# Patient Record
Sex: FEMALE | Race: WHITE | Employment: FULL TIME | URBAN - METROPOLITAN AREA
[De-identification: names, ages, dates, MRNs, and addresses within clinical notes are randomized per-mention and may not be internally consistent; named-entity substitution may affect disease eponyms.]

---

## 2017-04-03 ENCOUNTER — TELEPHONE (OUTPATIENT)
Dept: FAMILY MEDICINE CLINIC | Facility: CLINIC | Age: 42
End: 2017-04-03

## 2017-04-03 RX ORDER — ESCITALOPRAM OXALATE 10 MG/1
10 TABLET ORAL DAILY
Qty: 90 TABLET | Refills: 0 | Status: SHIPPED | OUTPATIENT
Start: 2017-04-03 | End: 2017-06-28

## 2017-04-03 RX ORDER — ESCITALOPRAM OXALATE 10 MG/1
1 TABLET ORAL DAILY
COMMUNITY
Start: 2013-11-13 | End: 2017-04-03

## 2017-04-03 RX ORDER — FLUTICASONE PROPIONATE 50 MCG
2 SPRAY, SUSPENSION (ML) NASAL DAILY
COMMUNITY
Start: 2016-03-19 | End: 2018-01-27 | Stop reason: ALTCHOICE

## 2017-04-05 ENCOUNTER — TELEPHONE (OUTPATIENT)
Dept: FAMILY MEDICINE CLINIC | Facility: CLINIC | Age: 42
End: 2017-04-05

## 2017-04-05 NOTE — TELEPHONE ENCOUNTER
Patient [facility]. Phoned [facility]. They will fill Rx there. Patient informed Pharmacist is filling Rx for Her to  soon.   Fouzia Angeles, 04/05/2017, 2:22 PM

## 2017-06-28 RX ORDER — ESCITALOPRAM OXALATE 10 MG/1
TABLET ORAL
Qty: 90 TABLET | Refills: 0 | Status: SHIPPED | OUTPATIENT
Start: 2017-06-28 | End: 2017-10-13

## 2017-10-13 RX ORDER — ESCITALOPRAM OXALATE 10 MG/1
TABLET ORAL
Qty: 90 TABLET | Refills: 0 | Status: SHIPPED | OUTPATIENT
Start: 2017-10-13 | End: 2018-01-11

## 2017-10-13 NOTE — TELEPHONE ENCOUNTER
Future appt:  None  Last Appointment:  3/19/2016 (Physical with [facility]);  Next Physical Due: 03/20/2017    No results found for: CHOLEST, HDL, LDL, TRIGLY, TRIG  No results found for: EAG, A1C  No results found for: T4F, TSH, TSHT4    Last Labs: 4/3/2016

## 2018-01-08 RX ORDER — ESCITALOPRAM OXALATE 10 MG/1
TABLET ORAL
Qty: 90 TABLET | Refills: 0 | OUTPATIENT
Start: 2018-01-08

## 2018-01-08 NOTE — TELEPHONE ENCOUNTER
Future appt:    Last Appointment:  3/19/16  No results found for: CHOLEST, HDL, LDL, TRIGLY, TRIG  No results found for: EAG, A1C  No results found for: T4F, TSH, TSHT4    No Follow-up on file.

## 2018-01-11 ENCOUNTER — TELEPHONE (OUTPATIENT)
Dept: FAMILY MEDICINE CLINIC | Facility: CLINIC | Age: 43
End: 2018-01-11

## 2018-01-11 RX ORDER — ESCITALOPRAM OXALATE 10 MG/1
10 TABLET ORAL
Qty: 30 TABLET | Refills: 0 | Status: SHIPPED | OUTPATIENT
Start: 2018-01-11 | End: 2018-01-27

## 2018-01-11 NOTE — TELEPHONE ENCOUNTER
Your appointments     Date & Time Appointment Department [facility])    Jan 27, 2018  9:30 AM CST Exam - Established Patient with BOUBACAR Monk [facility], [facility] ([place])        Dahlia 26, S

## 2018-01-27 NOTE — PROGRESS NOTES
HPI:    Patient ID: Mauricio Stone is a 43year old female. HPI     Present for review of her medication. States that she is feeling more down. States that she is more that she doesn't care about things as much - feels \"lazy. \"   States that she chasity 30 tablet 1      Sig: Take 1 tablet (20 mg total) by mouth daily. Imaging & Referrals:  None    Patient Instructions   Increase Lexapro to 20 mg daily. Recheck in about one month - sooner if needed.    Encouraged to get physical soon         ID#

## 2018-01-30 NOTE — PATIENT INSTRUCTIONS
Increase Lexapro to 20 mg daily. Recheck in about one month - sooner if needed.    Encouraged to get physical soon

## 2018-03-24 RX ORDER — ESCITALOPRAM OXALATE 20 MG/1
TABLET ORAL
Qty: 30 TABLET | Refills: 0 | Status: SHIPPED | OUTPATIENT
Start: 2018-03-24 | End: 2018-04-26

## 2018-03-24 NOTE — TELEPHONE ENCOUNTER
Patient informed of the below recommendations. States she would like Dr. Jus Hill to be her PCP, didn't know it was listed as Dr. Kandace Lr. Patient also wants to f/u with Dr. Jus Hill so she will c/b when she needs another RF to see if she can get one at that time.

## 2018-03-24 NOTE — TELEPHONE ENCOUNTER
Appt scheduled with Dr. En Prado on 6/14/2018 (per her request). Patient states she had to cancel her previous appt due to her work schedule and wanted to r/s for this summer.   Patient states she will be out of this medication today and is hoping to get RFs t

## 2018-03-24 NOTE — TELEPHONE ENCOUNTER
This is NEW medication prescribed by CS in January. Pt was to return in a MONTH for f.u. I will give her a month refill at this time but I am not comfortable with her on a new medication for this long with no followup.   Pt can make appt with CS for f.u if

## 2018-04-26 NOTE — PROGRESS NOTES
HPI:    Patient ID: Jennie Viera is a 37year old female. HPI    Patient is present for review of her Lexapro. States that she is doing very well with the adjustment in dosage. States that she is able to concentrate better. Denies any side effects. #[id number]

## 2018-06-23 NOTE — PROGRESS NOTES
HPI:    Patient ID: Gifty Torres is a 37year old female. HPI     Doing well with her Lexapro. States that her appointment for a physical with Dr. Shawn Flowers was cancelled and she was concerned about running out of her medication.  She will reschedule her

## 2018-07-16 ENCOUNTER — OFFICE VISIT (OUTPATIENT)
Dept: FAMILY MEDICINE CLINIC | Facility: CLINIC | Age: 43
End: 2018-07-16

## 2018-07-16 VITALS
TEMPERATURE: 98 F | SYSTOLIC BLOOD PRESSURE: 104 MMHG | DIASTOLIC BLOOD PRESSURE: 70 MMHG | HEART RATE: 68 BPM | HEIGHT: 70 IN | WEIGHT: 174 LBS | BODY MASS INDEX: 24.91 KG/M2 | RESPIRATION RATE: 16 BRPM

## 2018-07-16 DIAGNOSIS — T14.8XXA HEMATOMA: Primary | ICD-10-CM

## 2018-07-16 PROCEDURE — 99213 OFFICE O/P EST LOW 20 MIN: CPT | Performed by: FAMILY MEDICINE

## 2018-07-16 NOTE — PROGRESS NOTES
[facility] [place]  PROGRESS NOTE        HPI:   This is a 37year old female coming in for Patient presents with:  Fall: fell down stairs a week and a half ago, bump on right buttock    HPI  States eshe fell down her steps about a week and half (36.7 °C) (Temporal)   Resp 16   Ht 70\"   Wt 174 lb   LMP 07/15/2018   BMI 24.97 kg/m²  Estimated body mass index is 24.97 kg/m² as calculated from the following:    Height as of this encounter: 70\". Weight as of this encounter: 174 lb.    Vital signs

## 2018-07-17 ENCOUNTER — HOSPITAL ENCOUNTER (OUTPATIENT)
Dept: GENERAL RADIOLOGY | Age: 43
Discharge: HOME | End: 2018-07-17
Attending: FAMILY MEDICINE
Payer: COMMERCIAL

## 2018-07-17 ENCOUNTER — TELEPHONE (OUTPATIENT)
Dept: FAMILY MEDICINE CLINIC | Facility: CLINIC | Age: 43
End: 2018-07-17

## 2018-07-17 DIAGNOSIS — T14.8XXA HEMATOMA: ICD-10-CM

## 2018-07-17 PROCEDURE — 72170 X-RAY EXAM OF PELVIS: CPT | Performed by: FAMILY MEDICINE

## 2018-07-17 NOTE — TELEPHONE ENCOUNTER
----- Message from Yanet Hawkins MD sent at 7/17/2018 10:50 AM CDT -----  No evidence of fracture. Pain medication. Monitor    Call if not improving.

## 2018-07-23 NOTE — TELEPHONE ENCOUNTER
No return calls from [address] [facility] [address] message sent to patient with xray results    Dio Jenkins, 07/23/18, 9:30 AM

## 2018-07-23 NOTE — TELEPHONE ENCOUNTER
Patient notified of results and recommendations and expressed understanding.     Omaira Jenkins, 07/23/18, 9:26 AM

## 2018-08-06 ENCOUNTER — OFFICE VISIT (OUTPATIENT)
Dept: FAMILY MEDICINE CLINIC | Facility: CLINIC | Age: 43
End: 2018-08-06
Payer: COMMERCIAL

## 2018-08-06 VITALS
HEIGHT: 70 IN | BODY MASS INDEX: 24.39 KG/M2 | HEART RATE: 72 BPM | DIASTOLIC BLOOD PRESSURE: 62 MMHG | SYSTOLIC BLOOD PRESSURE: 96 MMHG | TEMPERATURE: 98 F | RESPIRATION RATE: 18 BRPM | WEIGHT: 170.38 LBS

## 2018-08-06 DIAGNOSIS — Z00.00 ENCOUNTER FOR ROUTINE ADULT HEALTH EXAMINATION WITHOUT ABNORMAL FINDINGS: Primary | ICD-10-CM

## 2018-08-06 DIAGNOSIS — Z12.11 SCREENING FOR COLON CANCER: ICD-10-CM

## 2018-08-06 LAB
OCCULT BLOOD, STOOL 1: NEGATIVE
PERFORMANCE MONITORS CORRECT (YES/NO): YES YES/NO
TEST CARD EXPIRATION DATE: 01 21 DATE

## 2018-08-06 PROCEDURE — 88175 CYTOPATH C/V AUTO FLUID REDO: CPT | Performed by: FAMILY MEDICINE

## 2018-08-06 PROCEDURE — 93000 ELECTROCARDIOGRAM COMPLETE: CPT | Performed by: FAMILY MEDICINE

## 2018-08-06 PROCEDURE — 82272 OCCULT BLD FECES 1-3 TESTS: CPT | Performed by: FAMILY MEDICINE

## 2018-08-06 PROCEDURE — 99396 PREV VISIT EST AGE 40-64: CPT | Performed by: FAMILY MEDICINE

## 2018-08-06 RX ORDER — ESCITALOPRAM OXALATE 20 MG/1
20 TABLET ORAL
Qty: 90 TABLET | Refills: 3 | Status: SHIPPED | OUTPATIENT
Start: 2018-08-06 | End: 2018-12-17

## 2018-08-06 NOTE — PROGRESS NOTES
[facility] [place]  PROGRESS NOTE        HPI:   This is a 37year old female coming in for Patient presents with:  Physical    HPI  Pt here for full physical.   No concerns today. Overall doing well.    Cycles are every 38 days, short 4 days an Negative. Neurological: Negative. Hematological: Negative. Psychiatric/Behavioral: Negative. All other systems reviewed and are negative.       EXAM:   BP 96/62 (BP Location: Left arm, Patient Position: Sitting, Cuff Size: adult)   Pulse 72   Te routine adult health examination without abnormal findings  -     COMP METABOLIC PANEL (14); Future  -     LIPID PANEL;  Future  -     TSH W REFLEX TO FREE T4; Future  -     VITAMIN D, 25-HYDROXY; Future  -     ELECTROCARDIOGRAM, COMPLETE    Reviewed health

## 2018-08-11 ENCOUNTER — APPOINTMENT (OUTPATIENT)
Dept: LAB | Age: 43
End: 2018-08-11
Attending: FAMILY MEDICINE
Payer: COMMERCIAL

## 2018-08-11 DIAGNOSIS — Z00.00 ENCOUNTER FOR ROUTINE ADULT HEALTH EXAMINATION WITHOUT ABNORMAL FINDINGS: ICD-10-CM

## 2018-08-11 LAB
ALBUMIN SERPL-MCNC: 4.2 G/DL (ref 3.5–4.8)
ALBUMIN/GLOB SERPL: 1.3 {RATIO} (ref 1–2)
ALP LIVER SERPL-CCNC: 71 U/L (ref 37–98)
ALT SERPL-CCNC: 21 U/L (ref 14–54)
ANION GAP SERPL CALC-SCNC: 10 MMOL/L (ref 0–18)
AST SERPL-CCNC: 18 U/L (ref 15–41)
BILIRUB SERPL-MCNC: 0.3 MG/DL (ref 0.1–2)
BUN BLD-MCNC: 12 MG/DL (ref 8–20)
BUN/CREAT SERPL: 14.5 (ref 10–20)
CALCIUM BLD-MCNC: 9 MG/DL (ref 8.3–10.3)
CHLORIDE SERPL-SCNC: 105 MMOL/L (ref 101–111)
CHOLEST SMN-MCNC: 165 MG/DL (ref ?–200)
CO2 SERPL-SCNC: 25 MMOL/L (ref 22–32)
CREAT BLD-MCNC: 0.83 MG/DL (ref 0.55–1.02)
GLOBULIN PLAS-MCNC: 3.3 G/DL (ref 2.5–3.7)
GLUCOSE BLD-MCNC: 91 MG/DL (ref 70–99)
HDLC SERPL-MCNC: 75 MG/DL (ref 40–59)
LDLC SERPL CALC-MCNC: 73 MG/DL (ref ?–100)
M PROTEIN MFR SERPL ELPH: 7.5 G/DL (ref 6.1–8.3)
NONHDLC SERPL-MCNC: 90 MG/DL (ref ?–130)
OSMOLALITY SERPL CALC.SUM OF ELEC: 289 MOSM/KG (ref 275–295)
POTASSIUM SERPL-SCNC: 4.3 MMOL/L (ref 3.6–5.1)
SODIUM SERPL-SCNC: 140 MMOL/L (ref 136–144)
TRIGL SERPL-MCNC: 87 MG/DL (ref 30–149)
TSI SER-ACNC: 0.84 MIU/ML (ref 0.35–5.5)
VIT D+METAB SERPL-MCNC: 16.8 NG/ML (ref 30–100)
VLDLC SERPL CALC-MCNC: 17 MG/DL (ref 0–30)

## 2018-08-11 PROCEDURE — 82306 VITAMIN D 25 HYDROXY: CPT | Performed by: FAMILY MEDICINE

## 2018-08-11 PROCEDURE — 36415 COLL VENOUS BLD VENIPUNCTURE: CPT | Performed by: FAMILY MEDICINE

## 2018-08-11 PROCEDURE — 80061 LIPID PANEL: CPT | Performed by: FAMILY MEDICINE

## 2018-08-11 PROCEDURE — 80053 COMPREHEN METABOLIC PANEL: CPT | Performed by: FAMILY MEDICINE

## 2018-08-11 PROCEDURE — 84443 ASSAY THYROID STIM HORMONE: CPT | Performed by: FAMILY MEDICINE

## 2018-08-13 ENCOUNTER — TELEPHONE (OUTPATIENT)
Dept: FAMILY MEDICINE CLINIC | Facility: CLINIC | Age: 43
End: 2018-08-13

## 2018-08-13 DIAGNOSIS — E55.9 VITAMIN D DEFICIENCY: Primary | ICD-10-CM

## 2018-08-13 NOTE — TELEPHONE ENCOUNTER
----- Message from Ceci Woods MD sent at 8/13/2018  9:27 AM CDT -----  Vitamin D is very low,   Recommend 13575 units of vitamin D weekly x 12 weeks. Recheck vitamin D level in 3 months. Then follow up with 2000 units of vitamin D daily.

## 2018-08-17 RX ORDER — ERGOCALCIFEROL 1.25 MG/1
50000 CAPSULE ORAL WEEKLY
Qty: 12 CAPSULE | Refills: 0 | Status: SHIPPED | OUTPATIENT
Start: 2018-08-17 | End: 2018-11-03

## 2018-09-29 ENCOUNTER — IMMUNIZATION (OUTPATIENT)
Dept: FAMILY MEDICINE CLINIC | Facility: CLINIC | Age: 43
End: 2018-09-29
Payer: COMMERCIAL

## 2018-09-29 DIAGNOSIS — Z23 NEED FOR VACCINATION: ICD-10-CM

## 2018-09-29 PROCEDURE — 90471 IMMUNIZATION ADMIN: CPT | Performed by: FAMILY MEDICINE

## 2018-09-29 PROCEDURE — 90686 IIV4 VACC NO PRSV 0.5 ML IM: CPT | Performed by: FAMILY MEDICINE

## 2018-11-12 RX ORDER — ERGOCALCIFEROL 1.25 MG/1
CAPSULE ORAL
Qty: 12 CAPSULE | Refills: 0 | OUTPATIENT
Start: 2018-11-12

## 2018-11-12 NOTE — TELEPHONE ENCOUNTER
Refill is not appropriate at this time  Patient is due for a recheck of her vitamin d level per Dr. Whalen Greek recommendations  Patient notified and expressed understanding  Refill denied    May Jarrett, 11/12/18, 9:44 AM

## 2018-11-23 RX ORDER — ERGOCALCIFEROL 1.25 MG/1
CAPSULE ORAL
Qty: 12 CAPSULE | Refills: 0 | OUTPATIENT
Start: 2018-11-23

## 2018-11-23 NOTE — TELEPHONE ENCOUNTER
Informed pt she was due for a lab draw to see where Vitamin D levels are at. There is an order in chart. Patient refused to make appt for lab draw and stated she would just start taking a daily supplement.  Pt was not taking a supplement before starting the

## 2018-12-18 NOTE — PATIENT INSTRUCTIONS
Vitalnutrients. net  (0528 access number) for vitamins     Vitamin D is sub therapeutic. Recommend 2000 units of vitamin D daily, increase if already taking. Goal of 51 for vitamin D   Recheck vitamin D level in 6  months.

## 2018-12-18 NOTE — PROGRESS NOTES
[facility] [place]  PROGRESS NOTE        HPI:   This is a 37year old female coming in for Patient presents with:  Medication Follow-Up: lexapro    HPI  Employer went with a new prescription coverage plan and now needs to go through express scri WISDOM TEETH REMOVED       Social History:  Social History    Social History Narrative      Lives at home       Farms:       Teaches full time. 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      Social History    To Constitutional: She is oriented to person, place, and time. She appears well-developed and well-nourished. HENT:   Head: Normocephalic and atraumatic.    Right Ear: External ear normal.   Left Ear: External ear normal.   Nose: Nose normal.   Mouth/Throa Kg Schwartz MD  12/17/2018  6:31 PM  Immunization History   Administered Date(s) Administered   • FLULAVAL 6 months & older 0.5 ml Prefilled syringe (40742) 09/29/2018   • TDAP 09/08/2014       Most Recent Immunizations  Administered

## 2019-08-06 ENCOUNTER — HOSPITAL ENCOUNTER (OUTPATIENT)
Dept: MAMMOGRAPHY | Age: 44
Discharge: HOME | End: 2019-08-06
Attending: FAMILY MEDICINE
Payer: COMMERCIAL

## 2019-08-06 DIAGNOSIS — Z12.31 BREAST CANCER SCREENING BY MAMMOGRAM: ICD-10-CM

## 2019-08-06 PROCEDURE — 77063 BREAST TOMOSYNTHESIS BI: CPT | Performed by: FAMILY MEDICINE

## 2019-08-06 PROCEDURE — 77067 SCR MAMMO BI INCL CAD: CPT | Performed by: FAMILY MEDICINE

## 2019-10-15 ENCOUNTER — OFFICE VISIT (OUTPATIENT)
Dept: FAMILY MEDICINE CLINIC | Facility: CLINIC | Age: 44
End: 2019-10-15
Payer: COMMERCIAL

## 2019-10-15 VITALS
HEART RATE: 66 BPM | DIASTOLIC BLOOD PRESSURE: 70 MMHG | OXYGEN SATURATION: 99 % | SYSTOLIC BLOOD PRESSURE: 118 MMHG | RESPIRATION RATE: 16 BRPM | TEMPERATURE: 98 F | WEIGHT: 181.19 LBS | HEIGHT: 70 IN | BODY MASS INDEX: 25.94 KG/M2

## 2019-10-15 DIAGNOSIS — S61.301D: Primary | ICD-10-CM

## 2019-10-15 PROCEDURE — 99213 OFFICE O/P EST LOW 20 MIN: CPT | Performed by: NURSE PRACTITIONER

## 2019-10-15 RX ORDER — CEPHALEXIN 500 MG/1
1 CAPSULE ORAL 4 TIMES DAILY
COMMUNITY
Start: 2019-10-14 | End: 2020-10-26 | Stop reason: ALTCHOICE

## 2019-10-15 NOTE — PROGRESS NOTES
HPI:    Patient ID: Ian Bales is a 40year old female. HPI     Patient is present with her  with concerns about her left little finger. States on Sunday she caught it in a log splitter [place] their cabin. Went to the ER in [place].  States th Open wound of left index finger with damage to nail, subsequent encounter  (primary encounter diagnosis)    No orders of the defined types were placed in this encounter.       Meds This Visit:  Requested Prescriptions      No prescriptions requested or orde

## 2019-10-16 RX ORDER — ESCITALOPRAM OXALATE 20 MG/1
TABLET ORAL
Qty: 90 TABLET | Refills: 3 | Status: SHIPPED | OUTPATIENT
Start: 2019-10-16 | End: 2019-10-19 | Stop reason: ALTCHOICE

## 2019-10-16 NOTE — TELEPHONE ENCOUNTER
Last refill 12/17/19  Future appt:     Your appointments     Date & Time Appointment Department [facility])    Oct 17, 2019  3:00 PM CDT Follow Up Visit with Cesario Doty [address] [place] [address], [place] ([place])

## 2019-10-17 ENCOUNTER — OFFICE VISIT (OUTPATIENT)
Dept: FAMILY MEDICINE CLINIC | Facility: CLINIC | Age: 44
End: 2019-10-17
Payer: COMMERCIAL

## 2019-10-17 ENCOUNTER — HOSPITAL ENCOUNTER (OUTPATIENT)
Dept: GENERAL RADIOLOGY | Age: 44
Discharge: HOME | End: 2019-10-17
Attending: NURSE PRACTITIONER
Payer: COMMERCIAL

## 2019-10-17 VITALS
HEART RATE: 74 BPM | BODY MASS INDEX: 25.91 KG/M2 | RESPIRATION RATE: 14 BRPM | WEIGHT: 181 LBS | SYSTOLIC BLOOD PRESSURE: 110 MMHG | DIASTOLIC BLOOD PRESSURE: 64 MMHG | TEMPERATURE: 97 F | HEIGHT: 70 IN | OXYGEN SATURATION: 99 %

## 2019-10-17 DIAGNOSIS — M79.645 FINGER PAIN, LEFT: ICD-10-CM

## 2019-10-17 DIAGNOSIS — S62.637D OPEN DISPLACED FRACTURE OF DISTAL PHALANX OF LEFT LITTLE FINGER WITH ROUTINE HEALING, SUBSEQUENT ENCOUNTER: Primary | ICD-10-CM

## 2019-10-17 PROCEDURE — 73140 X-RAY EXAM OF FINGER(S): CPT | Performed by: NURSE PRACTITIONER

## 2019-10-17 PROCEDURE — 99214 OFFICE O/P EST MOD 30 MIN: CPT | Performed by: NURSE PRACTITIONER

## 2019-10-17 NOTE — PATIENT INSTRUCTIONS
Referral to ortho - Dr. Esther Contreras. Take copy of x-ray to the appointment. 8:10 am,    Keep finger clean and dry. Continue with Keflex until done. Return to clinic if any signs of infection.

## 2019-10-17 NOTE — PROGRESS NOTES
HPI:    Patient ID: Gifty Torres is a 40year old female. HPI  Patient presents to the clinic today for follow up of left finger injury. Patient was using log splitter when she caught her left 5th finger in it. She had a surgicel wrap placed at the t Return to clinic if any signs of infection.               RA#[id number]

## 2019-10-18 NOTE — PATIENT INSTRUCTIONS
Keep area clean and dry. Recheck in 2 days to have dressing checked and x-ray without the surgicel and dressing. Continue with ibuprofen as needed.

## 2019-10-19 ENCOUNTER — OFFICE VISIT (OUTPATIENT)
Dept: FAMILY MEDICINE CLINIC | Facility: CLINIC | Age: 44
End: 2019-10-19
Payer: COMMERCIAL

## 2019-10-19 VITALS
TEMPERATURE: 98 F | DIASTOLIC BLOOD PRESSURE: 64 MMHG | WEIGHT: 176.38 LBS | HEIGHT: 70 IN | RESPIRATION RATE: 16 BRPM | HEART RATE: 60 BPM | OXYGEN SATURATION: 99 % | BODY MASS INDEX: 25.25 KG/M2 | SYSTOLIC BLOOD PRESSURE: 108 MMHG

## 2019-10-19 DIAGNOSIS — S62.637D OPEN DISPLACED FRACTURE OF DISTAL PHALANX OF LEFT LITTLE FINGER WITH ROUTINE HEALING, SUBSEQUENT ENCOUNTER: Primary | ICD-10-CM

## 2019-10-19 PROBLEM — S62.637B OPEN DISPLACED FRACTURE OF DISTAL PHALANX OF LEFT LITTLE FINGER: Status: ACTIVE | Noted: 2019-10-19

## 2019-10-19 PROCEDURE — 99213 OFFICE O/P EST LOW 20 MIN: CPT | Performed by: FAMILY MEDICINE

## 2019-10-19 RX ORDER — HYDROCODONE BITARTRATE AND ACETAMINOPHEN 5; 325 MG/1; MG/1
1 TABLET ORAL EVERY 4 HOURS PRN
COMMUNITY
Start: 2019-10-17 | End: 2020-10-26 | Stop reason: ALTCHOICE

## 2019-10-19 NOTE — PROGRESS NOTES
[facility] [place]  PROGRESS NOTE        HPI:   This is a 40year old female coming in for Patient presents with:   Follow - Up: Fractured finger, dressing change    HPI patient presents for follow-up and dressing change of her compressed fractur information on file (likely too young). Social History Narrative      Lives at home       Farms:       Teaches full time. 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      Social History    Tobacco Use      Smok oz (77.3 kg)  07/16/18 : 174 lb (78.9 kg)     BP Readings from Last 3 Encounters:  10/19/19 : 108/64  10/17/19 : 110/64  10/15/19 : 118/70       Vital signs reviewed. Physical Exam Musculoskeletal:      Comments: Full range of motion of the fifth digit.

## 2020-04-03 ENCOUNTER — TELEPHONE (OUTPATIENT)
Dept: FAMILY MEDICINE CLINIC | Facility: CLINIC | Age: 45
End: 2020-04-03

## 2020-04-03 DIAGNOSIS — N92.1 MENOMETRORRHAGIA: Primary | ICD-10-CM

## 2020-04-03 NOTE — TELEPHONE ENCOUNTER
Patient states she has had her menstrual cycle for about 3 weeks now. States it has not been heavy; about light to medium. Patient states she uses pads and has been having to change it about 3-4 times a day.   Patient also c/o slight cramping at times but

## 2020-04-03 NOTE — TELEPHONE ENCOUNTER
Spoke with Dimitrios Kaur as all schedules are on hold next week. She stated that this is being worked on this afternoon and that it is OK to schedule patient on Tuesday, 4/7/2020, when Dr. Urszula Mcneal is in the office.   Dimitrios Kaur will need to be informed once we know what

## 2020-04-03 NOTE — TELEPHONE ENCOUNTER
Dr. Padmini Zaldivar is out of the office today. Recommend that patient schedule an appointment with a provider in the office. May need labs and/or ultrasound.

## 2020-04-06 NOTE — TELEPHONE ENCOUNTER
Pt states her bleeding has slowed down and now is just a trickle,  She state the bleeding was manageable. Follow up in 1 month. She feels like it could wait.

## 2020-10-21 NOTE — TELEPHONE ENCOUNTER
RF for Escitalopram; hasn't been refilled since 12/17/2018. Placed call to patient to confirm Rx. No answer and mailbox is full.

## 2020-10-26 NOTE — PROGRESS NOTES
[facility] [place]  PROGRESS NOTE        HPI:   This is a 39year old female coming in for Patient presents with:  Medication Follow-Up: Pt states that meds are making her dizzy    HPI  States that her lexapro has been making her dizzy for the p Depression      Past Surgical History:   Procedure Laterality Date   •      • WISDOM TEETH REMOVED       Social History:  Social History    Social History Narrative      Lives at home       Farms:       Teaches full time.        Has 3 children:  Gr 9.6 oz (80.6 kg)   LMP 10/22/2020   SpO2 97%   BMI 25.48 kg/m²  Estimated body mass index is 25.48 kg/m² as calculated from the following:    Height as of this encounter: 70\". Weight as of this encounter: 177 lb 9.6 oz (80.6 kg).    Wt Readings from [place] Tab; Take 1 tablet (10 mg total) by mouth daily.  -     FLULAVAL INFLUENZA VACCINE QUAD PRESERVATIVE FREE 0.5 ML          No problem-specific Assessment & Plan notes found for this encounter. future appointment:    No follow-ups on file.     Meds & Re

## 2020-11-03 RX ORDER — ESCITALOPRAM OXALATE 20 MG/1
TABLET ORAL
Qty: 90 TABLET | Refills: 3 | OUTPATIENT
Start: 2020-11-03

## 2020-12-28 ENCOUNTER — OFFICE VISIT (OUTPATIENT)
Dept: FAMILY MEDICINE CLINIC | Facility: CLINIC | Age: 45
End: 2020-12-28
Payer: COMMERCIAL

## 2020-12-28 VITALS
TEMPERATURE: 98 F | RESPIRATION RATE: 16 BRPM | OXYGEN SATURATION: 98 % | DIASTOLIC BLOOD PRESSURE: 80 MMHG | SYSTOLIC BLOOD PRESSURE: 104 MMHG | WEIGHT: 170.19 LBS | BODY MASS INDEX: 24.37 KG/M2 | HEIGHT: 70 IN | HEART RATE: 73 BPM

## 2020-12-28 DIAGNOSIS — M25.511 RIGHT SHOULDER PAIN, UNSPECIFIED CHRONICITY: ICD-10-CM

## 2020-12-28 DIAGNOSIS — M75.101 ROTATOR CUFF SYNDROME OF RIGHT SHOULDER: ICD-10-CM

## 2020-12-28 DIAGNOSIS — Z00.00 WELLNESS EXAMINATION: Primary | ICD-10-CM

## 2020-12-28 DIAGNOSIS — Z72.0 TOBACCO ABUSE: ICD-10-CM

## 2020-12-28 DIAGNOSIS — E55.9 VITAMIN D DEFICIENCY: ICD-10-CM

## 2020-12-28 DIAGNOSIS — N63.20 LEFT BREAST MASS: ICD-10-CM

## 2020-12-28 PROBLEM — S62.637B OPEN DISPLACED FRACTURE OF DISTAL PHALANX OF LEFT LITTLE FINGER: Status: RESOLVED | Noted: 2019-10-19 | Resolved: 2020-12-28

## 2020-12-28 PROCEDURE — 99396 PREV VISIT EST AGE 40-64: CPT | Performed by: FAMILY MEDICINE

## 2020-12-28 PROCEDURE — 99213 OFFICE O/P EST LOW 20 MIN: CPT | Performed by: FAMILY MEDICINE

## 2020-12-28 PROCEDURE — 99406 BEHAV CHNG SMOKING 3-10 MIN: CPT | Performed by: FAMILY MEDICINE

## 2020-12-28 PROCEDURE — 3074F SYST BP LT 130 MM HG: CPT | Performed by: FAMILY MEDICINE

## 2020-12-28 PROCEDURE — 3008F BODY MASS INDEX DOCD: CPT | Performed by: FAMILY MEDICINE

## 2020-12-28 PROCEDURE — 3079F DIAST BP 80-89 MM HG: CPT | Performed by: FAMILY MEDICINE

## 2020-12-28 NOTE — PATIENT INSTRUCTIONS
Call [facility] [phone] to schedule testing. MAMMOGRAM>       Quitting Smoking    Quitting smoking is the most important step you can take to improve your health. We're glad you have set a goal to improve your health.     Quit [employer]Base

## 2020-12-28 NOTE — PROGRESS NOTES
Tobacco Cessation Documentation (Smoking and Smokeless included): I had an in depth therapy session with Jennie Viera about her tobacco use risks and options using the USPSTF's Five A's approach:    Ask: Palmer Alvarado is using tobacco products.   Assess: Current Outpatient Medications   Medication Sig Dispense Refill   • escitalopram (LEXAPRO) 10 MG Oral Tab Take 1 tablet (10 mg total) by mouth daily.  30 tablet 3      HISTORICAL INFORMATION   Past Medical History:   Diagnosis Date   • Anxiety    • Paramjit : 181 lb (82.1 kg)  10/15/19 : 181 lb 3.2 oz (82.2 kg)  12/17/18 : 173 lb (78.5 kg)    Body mass index is 24.42 kg/m². Physical Exam   Constitutional: She is oriented to person, place, and time. She appears well-developed and well-nourished.    HENT:   H unspecified chronicity  -     PHYSICAL THERAPY EXTERNAL  -     URINALYSIS WITH CULTURE REFLEX; Future  -     VITAMIN B12; Future    Rotator cuff syndrome of right shoulder  -     PHYSICAL THERAPY EXTERNAL    Vitamin D deficiency  -     VITAMIN B12; Future

## 2020-12-29 ENCOUNTER — TELEPHONE (OUTPATIENT)
Dept: FAMILY MEDICINE CLINIC | Facility: CLINIC | Age: 45
End: 2020-12-29

## 2020-12-29 DIAGNOSIS — N63.20 LEFT BREAST MASS: Primary | ICD-10-CM

## 2020-12-29 NOTE — TELEPHONE ENCOUNTER
Patient states she was told that [address] wouldn't do diagnostic mammo when they haven't done the screening mammo. Per Pricila Larsen, need new order to read bilateral diagnostic mammo, coded for left breast mass.

## 2020-12-29 NOTE — TELEPHONE ENCOUNTER
Received referral for mammogram yesterday but they do not perform that type. Would like to know where else she can go.

## 2020-12-29 NOTE — TELEPHONE ENCOUNTER
Spoke with Suad Palomo at [facility] and they only diagnostic bilateral mammograms. These may not be covered as screening health maintenance but problem evaluation. See if she would like to do [place] and we can change the location.

## 2021-01-04 ENCOUNTER — TELEPHONE (OUTPATIENT)
Dept: FAMILY MEDICINE CLINIC | Facility: CLINIC | Age: 46
End: 2021-01-04

## 2021-01-04 DIAGNOSIS — N63.20 LEFT BREAST MASS: Primary | ICD-10-CM

## 2021-01-04 NOTE — TELEPHONE ENCOUNTER
Pt needs a bilateral diagnostic mammogram order.  Please inform pt when placed so she can schedule with [facility].

## 2021-01-07 ENCOUNTER — TELEPHONE (OUTPATIENT)
Dept: FAMILY MEDICINE CLINIC | Facility: CLINIC | Age: 46
End: 2021-01-07

## 2021-01-07 ENCOUNTER — HOSPITAL ENCOUNTER (OUTPATIENT)
Dept: MAMMOGRAPHY | Facility: HOSPITAL | Age: 46
Discharge: HOME | End: 2021-01-07
Attending: FAMILY MEDICINE
Payer: COMMERCIAL

## 2021-01-07 DIAGNOSIS — N63.20 LEFT BREAST MASS: ICD-10-CM

## 2021-01-07 PROCEDURE — 76641 ULTRASOUND BREAST COMPLETE: CPT | Performed by: FAMILY MEDICINE

## 2021-01-07 PROCEDURE — 77062 BREAST TOMOSYNTHESIS BI: CPT | Performed by: FAMILY MEDICINE

## 2021-01-07 PROCEDURE — 77066 DX MAMMO INCL CAD BI: CPT | Performed by: FAMILY MEDICINE

## 2021-01-07 NOTE — TELEPHONE ENCOUNTER
----- Message from Bj Tinajero MD sent at 1/7/2021  2:15 PM CST -----  benign appearing cyst correlates with exam.   No further concerning features at this time. Recommend mammogram in 1 year. Recommend breast clinical exam in 6 months.

## 2021-01-08 ENCOUNTER — LABORATORY ENCOUNTER (OUTPATIENT)
Dept: LAB | Age: 46
End: 2021-01-08
Attending: FAMILY MEDICINE
Payer: COMMERCIAL

## 2021-01-08 DIAGNOSIS — R73.9 ELEVATED BLOOD SUGAR: ICD-10-CM

## 2021-01-08 DIAGNOSIS — Z00.00 WELLNESS EXAMINATION: ICD-10-CM

## 2021-01-08 DIAGNOSIS — Z72.0 TOBACCO ABUSE: ICD-10-CM

## 2021-01-08 DIAGNOSIS — M25.511 RIGHT SHOULDER PAIN, UNSPECIFIED CHRONICITY: ICD-10-CM

## 2021-01-08 DIAGNOSIS — E55.9 VITAMIN D DEFICIENCY: ICD-10-CM

## 2021-01-08 LAB
ALBUMIN SERPL-MCNC: 4.2 G/DL (ref 3.4–5)
ALBUMIN/GLOB SERPL: 1.2 {RATIO} (ref 1–2)
ALP LIVER SERPL-CCNC: 77 U/L
ALT SERPL-CCNC: 22 U/L
ANION GAP SERPL CALC-SCNC: 3 MMOL/L (ref 0–18)
AST SERPL-CCNC: 14 U/L (ref 15–37)
BASOPHILS # BLD AUTO: 0.06 X10(3) UL (ref 0–0.2)
BASOPHILS NFR BLD AUTO: 0.6 %
BILIRUB SERPL-MCNC: 0.2 MG/DL (ref 0.1–2)
BILIRUB UR QL STRIP.AUTO: NEGATIVE
BUN BLD-MCNC: 19 MG/DL (ref 7–18)
BUN/CREAT SERPL: 21.1 (ref 10–20)
CALCIUM BLD-MCNC: 9.5 MG/DL (ref 8.5–10.1)
CHLORIDE SERPL-SCNC: 104 MMOL/L (ref 98–112)
CK SERPL-CCNC: 92 U/L
CLARITY UR REFRACT.AUTO: CLEAR
CO2 SERPL-SCNC: 29 MMOL/L (ref 21–32)
COLOR UR AUTO: COLORLESS
CREAT BLD-MCNC: 0.9 MG/DL
DEPRECATED HBV CORE AB SER IA-ACNC: 44.3 NG/ML
DEPRECATED RDW RBC AUTO: 40.4 FL (ref 35.1–46.3)
EOSINOPHIL # BLD AUTO: 0.13 X10(3) UL (ref 0–0.7)
EOSINOPHIL NFR BLD AUTO: 1.3 %
ERYTHROCYTE [DISTWIDTH] IN BLOOD BY AUTOMATED COUNT: 12.2 % (ref 11–15)
GLOBULIN PLAS-MCNC: 3.5 G/DL (ref 2.8–4.4)
GLUCOSE BLD-MCNC: 113 MG/DL (ref 70–99)
GLUCOSE UR STRIP.AUTO-MCNC: NEGATIVE MG/DL
HCT VFR BLD AUTO: 39.6 %
HGB BLD-MCNC: 13.1 G/DL
IMM GRANULOCYTES # BLD AUTO: 0.02 X10(3) UL (ref 0–1)
IMM GRANULOCYTES NFR BLD: 0.2 %
IRON SATURATION: 15 %
IRON SERPL-MCNC: 61 UG/DL
KETONES UR STRIP.AUTO-MCNC: NEGATIVE MG/DL
LEUKOCYTE ESTERASE UR QL STRIP.AUTO: NEGATIVE
LYMPHOCYTES # BLD AUTO: 2.1 X10(3) UL (ref 1–4)
LYMPHOCYTES NFR BLD AUTO: 21.4 %
M PROTEIN MFR SERPL ELPH: 7.7 G/DL (ref 6.4–8.2)
MCH RBC QN AUTO: 29.8 PG (ref 26–34)
MCHC RBC AUTO-ENTMCNC: 33.1 G/DL (ref 31–37)
MCV RBC AUTO: 90 FL
MONOCYTES # BLD AUTO: 0.63 X10(3) UL (ref 0.1–1)
MONOCYTES NFR BLD AUTO: 6.4 %
NEUTROPHILS # BLD AUTO: 6.87 X10 (3) UL (ref 1.5–7.7)
NEUTROPHILS # BLD AUTO: 6.87 X10(3) UL (ref 1.5–7.7)
NEUTROPHILS NFR BLD AUTO: 70.1 %
NITRITE UR QL STRIP.AUTO: NEGATIVE
OSMOLALITY SERPL CALC.SUM OF ELEC: 285 MOSM/KG (ref 275–295)
PATIENT FASTING Y/N/NP: NO
PH UR STRIP.AUTO: 6 [PH] (ref 4.5–8)
PLATELET # BLD AUTO: 364 10(3)UL (ref 150–450)
POTASSIUM SERPL-SCNC: 3.6 MMOL/L (ref 3.5–5.1)
PROT UR STRIP.AUTO-MCNC: NEGATIVE MG/DL
RBC # BLD AUTO: 4.4 X10(6)UL
RBC UR QL AUTO: NEGATIVE
SODIUM SERPL-SCNC: 136 MMOL/L (ref 136–145)
SP GR UR STRIP.AUTO: <1.005 (ref 1–1.03)
TOTAL IRON BINDING CAPACITY: 405 UG/DL (ref 240–450)
TRANSFERRIN SERPL-MCNC: 272 MG/DL (ref 200–360)
TSI SER-ACNC: 1.32 MIU/ML (ref 0.36–3.74)
URATE SERPL-MCNC: 3.8 MG/DL
UROBILINOGEN UR STRIP.AUTO-MCNC: <2 MG/DL
VIT B12 SERPL-MCNC: 228 PG/ML (ref 193–986)
VIT D+METAB SERPL-MCNC: 45.1 NG/ML (ref 30–100)
WBC # BLD AUTO: 9.8 X10(3) UL (ref 4–11)

## 2021-01-08 PROCEDURE — 81003 URINALYSIS AUTO W/O SCOPE: CPT | Performed by: FAMILY MEDICINE

## 2021-01-08 PROCEDURE — 83540 ASSAY OF IRON: CPT | Performed by: FAMILY MEDICINE

## 2021-01-08 PROCEDURE — 82550 ASSAY OF CK (CPK): CPT | Performed by: FAMILY MEDICINE

## 2021-01-08 PROCEDURE — 84550 ASSAY OF BLOOD/URIC ACID: CPT | Performed by: FAMILY MEDICINE

## 2021-01-08 PROCEDURE — 82306 VITAMIN D 25 HYDROXY: CPT | Performed by: FAMILY MEDICINE

## 2021-01-08 PROCEDURE — 83550 IRON BINDING TEST: CPT | Performed by: FAMILY MEDICINE

## 2021-01-08 PROCEDURE — 80050 GENERAL HEALTH PANEL: CPT | Performed by: FAMILY MEDICINE

## 2021-01-08 PROCEDURE — 36415 COLL VENOUS BLD VENIPUNCTURE: CPT | Performed by: FAMILY MEDICINE

## 2021-01-08 PROCEDURE — 82607 VITAMIN B-12: CPT | Performed by: FAMILY MEDICINE

## 2021-01-08 PROCEDURE — 83036 HEMOGLOBIN GLYCOSYLATED A1C: CPT | Performed by: FAMILY MEDICINE

## 2021-01-08 PROCEDURE — 82728 ASSAY OF FERRITIN: CPT | Performed by: FAMILY MEDICINE

## 2021-01-11 ENCOUNTER — TELEPHONE (OUTPATIENT)
Dept: FAMILY MEDICINE CLINIC | Facility: CLINIC | Age: 46
End: 2021-01-11

## 2021-01-11 DIAGNOSIS — R73.9 ELEVATED BLOOD SUGAR: Primary | ICD-10-CM

## 2021-01-11 LAB
EST. AVERAGE GLUCOSE BLD GHB EST-MCNC: 120 MG/DL (ref 68–126)
HBA1C MFR BLD HPLC: 5.8 % (ref ?–5.7)

## 2021-01-11 NOTE — TELEPHONE ENCOUNTER
----- Message from Iggy Davis MD sent at 1/11/2021 10:22 AM CST -----  Vitamin B12 is subtherapeutic.   recommend \"B complex\" and recheck level in 6 months   Iron stores are low. Vitalnutrients. net  (0528 access number) for vitamins  Iron plus c bid

## 2021-01-12 NOTE — TELEPHONE ENCOUNTER
----- Message from Franklyn Ulrich MD sent at 1/11/2021  4:51 PM CST -----  aic looks fine. Monitor diet only.

## 2021-01-25 RX ORDER — ESCITALOPRAM OXALATE 10 MG/1
TABLET ORAL
Qty: 90 TABLET | Refills: 1 | Status: SHIPPED | OUTPATIENT
Start: 2021-01-25 | End: 2021-04-16

## 2021-01-25 NOTE — TELEPHONE ENCOUNTER
Future appt:    Last Appointment with provider:   12/28/2020(Px)-F/U in 1 year  Last appointment at [facility]:  12/28/2020    escitalopram (LEXAPRO) 10 MG Oral Tab    30tab  3refill      Filled:10/26/20 Summary: Take 1 tablet (10 mg total) by mouth troy

## 2021-04-16 ENCOUNTER — TELEPHONE (OUTPATIENT)
Dept: FAMILY MEDICINE CLINIC | Facility: CLINIC | Age: 46
End: 2021-04-16

## 2021-04-16 RX ORDER — ESCITALOPRAM OXALATE 10 MG/1
10 TABLET ORAL DAILY
Qty: 90 TABLET | Refills: 1 | Status: SHIPPED | OUTPATIENT
Start: 2021-04-16 | End: 2021-07-20

## 2021-04-16 NOTE — TELEPHONE ENCOUNTER
Future appt:    Last Appointment with provider:   12/28/2020(PX)-F/U in 1 year  Last appointment at [facility]:  12/28/2020    ESCITALOPRAM 10 MG Oral Tab          90tab  1refill    Filled:1/25/21   Summary: TAKE 1 TABLET BY MOUTH EVERY DAY            Ch

## 2021-06-29 ENCOUNTER — TELEPHONE (OUTPATIENT)
Dept: FAMILY MEDICINE CLINIC | Facility: CLINIC | Age: 46
End: 2021-06-29

## 2021-06-29 DIAGNOSIS — E55.9 VITAMIN D DEFICIENCY: Primary | ICD-10-CM

## 2021-06-29 DIAGNOSIS — N92.1 MENOMETRORRHAGIA: ICD-10-CM

## 2021-06-29 NOTE — TELEPHONE ENCOUNTER
Would recommend that we get labs prior to taht,   And then refer her to gyne to consider ablation if that is what is needed.

## 2021-06-29 NOTE — TELEPHONE ENCOUNTER
Pt made appt on Rixtyt for the following:    Patient Comments:  My periods are lasting weeks. I want an ablation. Appt ok to wait?     Your appointments     Date & Time Appointment Department [facility])    Jul 20, 2021  2:40 PM CDT Office Visit with [facility]

## 2021-07-01 NOTE — TELEPHONE ENCOUNTER
Labs scheduled.     Your appointments       Date & Time Appointment Department [facility])    Jul 08, 2021 10:00 AM CDT Laboratory Visit with [facility] ([facility])          Jul 20, 2021  2:40 PM CDT Office Visit with Sofi Sandhu

## 2021-07-08 ENCOUNTER — LABORATORY ENCOUNTER (OUTPATIENT)
Dept: LAB | Age: 46
End: 2021-07-08
Attending: FAMILY MEDICINE
Payer: COMMERCIAL

## 2021-07-08 DIAGNOSIS — Z00.00 WELLNESS EXAMINATION: ICD-10-CM

## 2021-07-08 DIAGNOSIS — N92.1 MENOMETRORRHAGIA: ICD-10-CM

## 2021-07-08 DIAGNOSIS — E55.9 VITAMIN D DEFICIENCY: ICD-10-CM

## 2021-07-08 LAB
ALBUMIN SERPL-MCNC: 3.9 G/DL (ref 3.4–5)
ALBUMIN/GLOB SERPL: 1.2 {RATIO} (ref 1–2)
ALP LIVER SERPL-CCNC: 65 U/L
ALT SERPL-CCNC: 22 U/L
ANION GAP SERPL CALC-SCNC: 4 MMOL/L (ref 0–18)
AST SERPL-CCNC: 18 U/L (ref 15–37)
BASOPHILS # BLD AUTO: 0.07 X10(3) UL (ref 0–0.2)
BASOPHILS NFR BLD AUTO: 1 %
BILIRUB SERPL-MCNC: 0.3 MG/DL (ref 0.1–2)
BILIRUB UR QL STRIP.AUTO: NEGATIVE
BUN BLD-MCNC: 10 MG/DL (ref 7–18)
BUN/CREAT SERPL: 13.9 (ref 10–20)
CALCIUM BLD-MCNC: 8.5 MG/DL (ref 8.5–10.1)
CHLORIDE SERPL-SCNC: 108 MMOL/L (ref 98–112)
CHOLEST SMN-MCNC: 182 MG/DL (ref ?–200)
CK SERPL-CCNC: 151 U/L
CLARITY UR REFRACT.AUTO: CLEAR
CO2 SERPL-SCNC: 27 MMOL/L (ref 21–32)
COLOR UR AUTO: YELLOW
CREAT BLD-MCNC: 0.72 MG/DL
DEPRECATED HBV CORE AB SER IA-ACNC: 38.1 NG/ML
DEPRECATED RDW RBC AUTO: 47.1 FL (ref 35.1–46.3)
EOSINOPHIL # BLD AUTO: 0.1 X10(3) UL (ref 0–0.7)
EOSINOPHIL NFR BLD AUTO: 1.4 %
ERYTHROCYTE [DISTWIDTH] IN BLOOD BY AUTOMATED COUNT: 13.2 % (ref 11–15)
FSH SERPL-ACNC: 16 MIU/ML
GLOBULIN PLAS-MCNC: 3.3 G/DL (ref 2.8–4.4)
GLUCOSE BLD-MCNC: 72 MG/DL (ref 70–99)
GLUCOSE UR STRIP.AUTO-MCNC: NEGATIVE MG/DL
HCT VFR BLD AUTO: 40.4 %
HDLC SERPL-MCNC: 73 MG/DL (ref 40–59)
HGB BLD-MCNC: 12.7 G/DL
IMM GRANULOCYTES # BLD AUTO: 0.02 X10(3) UL (ref 0–1)
IMM GRANULOCYTES NFR BLD: 0.3 %
IRON SATURATION: 15 %
IRON SERPL-MCNC: 58 UG/DL
KETONES UR STRIP.AUTO-MCNC: NEGATIVE MG/DL
LDLC SERPL CALC-MCNC: 93 MG/DL (ref ?–100)
LEUKOCYTE ESTERASE UR QL STRIP.AUTO: NEGATIVE
LH SERPL-ACNC: 9 MIU/ML
LYMPHOCYTES # BLD AUTO: 1.67 X10(3) UL (ref 1–4)
LYMPHOCYTES NFR BLD AUTO: 23.5 %
M PROTEIN MFR SERPL ELPH: 7.2 G/DL (ref 6.4–8.2)
MCH RBC QN AUTO: 30.2 PG (ref 26–34)
MCHC RBC AUTO-ENTMCNC: 31.4 G/DL (ref 31–37)
MCV RBC AUTO: 96.2 FL
MONOCYTES # BLD AUTO: 0.48 X10(3) UL (ref 0.1–1)
MONOCYTES NFR BLD AUTO: 6.7 %
NEUTROPHILS # BLD AUTO: 4.78 X10 (3) UL (ref 1.5–7.7)
NEUTROPHILS # BLD AUTO: 4.78 X10(3) UL (ref 1.5–7.7)
NEUTROPHILS NFR BLD AUTO: 67.1 %
NITRITE UR QL STRIP.AUTO: NEGATIVE
NONHDLC SERPL-MCNC: 109 MG/DL (ref ?–130)
OSMOLALITY SERPL CALC.SUM OF ELEC: 286 MOSM/KG (ref 275–295)
PATIENT FASTING Y/N/NP: YES
PATIENT FASTING Y/N/NP: YES
PH UR STRIP.AUTO: 7 [PH] (ref 5–8)
PLATELET # BLD AUTO: 379 10(3)UL (ref 150–450)
POTASSIUM SERPL-SCNC: 4.3 MMOL/L (ref 3.5–5.1)
PROGEST SERPL-MCNC: 0.42 NG/ML
PROLACTIN SERPL-MCNC: 6.4 NG/ML
PROT UR STRIP.AUTO-MCNC: NEGATIVE MG/DL
RBC # BLD AUTO: 4.2 X10(6)UL
RBC UR QL AUTO: NEGATIVE
SODIUM SERPL-SCNC: 139 MMOL/L (ref 136–145)
SP GR UR STRIP.AUTO: 1.02 (ref 1–1.03)
TOTAL IRON BINDING CAPACITY: 375 UG/DL (ref 240–450)
TRANSFERRIN SERPL-MCNC: 252 MG/DL (ref 200–360)
TRIGL SERPL-MCNC: 86 MG/DL (ref 30–149)
TSI SER-ACNC: 0.98 MIU/ML (ref 0.36–3.74)
URATE SERPL-MCNC: 3.4 MG/DL
UROBILINOGEN UR STRIP.AUTO-MCNC: <2 MG/DL
VIT B12 SERPL-MCNC: 183 PG/ML (ref 193–986)
VIT D+METAB SERPL-MCNC: 48.1 NG/ML (ref 30–100)
VLDLC SERPL CALC-MCNC: 14 MG/DL (ref 0–30)
WBC # BLD AUTO: 7.1 X10(3) UL (ref 4–11)

## 2021-07-08 PROCEDURE — 84550 ASSAY OF BLOOD/URIC ACID: CPT | Performed by: FAMILY MEDICINE

## 2021-07-08 PROCEDURE — 81003 URINALYSIS AUTO W/O SCOPE: CPT | Performed by: FAMILY MEDICINE

## 2021-07-08 PROCEDURE — 84402 ASSAY OF FREE TESTOSTERONE: CPT | Performed by: FAMILY MEDICINE

## 2021-07-08 PROCEDURE — 84403 ASSAY OF TOTAL TESTOSTERONE: CPT | Performed by: FAMILY MEDICINE

## 2021-07-08 PROCEDURE — 83002 ASSAY OF GONADOTROPIN (LH): CPT | Performed by: FAMILY MEDICINE

## 2021-07-08 PROCEDURE — 83550 IRON BINDING TEST: CPT | Performed by: FAMILY MEDICINE

## 2021-07-08 PROCEDURE — 84144 ASSAY OF PROGESTERONE: CPT | Performed by: FAMILY MEDICINE

## 2021-07-08 PROCEDURE — 80061 LIPID PANEL: CPT | Performed by: FAMILY MEDICINE

## 2021-07-08 PROCEDURE — 80050 GENERAL HEALTH PANEL: CPT | Performed by: FAMILY MEDICINE

## 2021-07-08 PROCEDURE — 82671 ASSAY OF ESTROGENS: CPT | Performed by: FAMILY MEDICINE

## 2021-07-08 PROCEDURE — 83540 ASSAY OF IRON: CPT | Performed by: FAMILY MEDICINE

## 2021-07-08 PROCEDURE — 84146 ASSAY OF PROLACTIN: CPT | Performed by: FAMILY MEDICINE

## 2021-07-08 PROCEDURE — 82550 ASSAY OF CK (CPK): CPT | Performed by: FAMILY MEDICINE

## 2021-07-08 PROCEDURE — 82728 ASSAY OF FERRITIN: CPT | Performed by: FAMILY MEDICINE

## 2021-07-08 PROCEDURE — 83001 ASSAY OF GONADOTROPIN (FSH): CPT | Performed by: FAMILY MEDICINE

## 2021-07-08 PROCEDURE — 82306 VITAMIN D 25 HYDROXY: CPT | Performed by: FAMILY MEDICINE

## 2021-07-08 PROCEDURE — 82607 VITAMIN B-12: CPT | Performed by: FAMILY MEDICINE

## 2021-07-09 ENCOUNTER — TELEPHONE (OUTPATIENT)
Dept: FAMILY MEDICINE CLINIC | Facility: CLINIC | Age: 46
End: 2021-07-09

## 2021-07-09 DIAGNOSIS — E53.8 B12 DEFICIENCY: ICD-10-CM

## 2021-07-09 DIAGNOSIS — R79.0 LOW IRON STORES: Primary | ICD-10-CM

## 2021-07-09 NOTE — TELEPHONE ENCOUNTER
----- Message from Tiera Baron MD sent at 7/9/2021  9:46 AM CDT -----  If patient is taking b12 supplementation it is time for injections.    Otherwise start Vitamin B12 is subtherapeutic.   recommend \"B complex\" and recheck level in 6 months   Iron sto

## 2021-07-12 LAB
ESTRADIOL BY TMS: 97.4 PG/ML
ESTROGENS TOTAL CALCULATION: 121.1 PG/ML
ESTRONE BY TMS: 23.7 PG/ML

## 2021-07-14 LAB
SEX HORMONE BINDING GLOBULIN: 81 NMOL/L
TESTOSTERONE -MS, BIOAVAILAB: 3.1 NG/DL
TESTOSTERONE, -MS/MS: 12 NG/DL
TESTOSTERONE, FREE -MS/MS: 1.1 PG/ML

## 2021-07-20 ENCOUNTER — OFFICE VISIT (OUTPATIENT)
Dept: FAMILY MEDICINE CLINIC | Facility: CLINIC | Age: 46
End: 2021-07-20
Payer: COMMERCIAL

## 2021-07-20 VITALS
TEMPERATURE: 97 F | HEART RATE: 73 BPM | DIASTOLIC BLOOD PRESSURE: 62 MMHG | SYSTOLIC BLOOD PRESSURE: 108 MMHG | WEIGHT: 160.38 LBS | HEIGHT: 70 IN | RESPIRATION RATE: 16 BRPM | BODY MASS INDEX: 22.96 KG/M2 | OXYGEN SATURATION: 98 %

## 2021-07-20 DIAGNOSIS — N92.1 MENOMETRORRHAGIA: Primary | ICD-10-CM

## 2021-07-20 DIAGNOSIS — E53.8 LOW SERUM VITAMIN B12: ICD-10-CM

## 2021-07-20 PROCEDURE — 3078F DIAST BP <80 MM HG: CPT | Performed by: FAMILY MEDICINE

## 2021-07-20 PROCEDURE — 88175 CYTOPATH C/V AUTO FLUID REDO: CPT | Performed by: FAMILY MEDICINE

## 2021-07-20 PROCEDURE — 99214 OFFICE O/P EST MOD 30 MIN: CPT | Performed by: FAMILY MEDICINE

## 2021-07-20 PROCEDURE — 87624 HPV HI-RISK TYP POOLED RSLT: CPT | Performed by: FAMILY MEDICINE

## 2021-07-20 PROCEDURE — 96372 THER/PROPH/DIAG INJ SC/IM: CPT | Performed by: FAMILY MEDICINE

## 2021-07-20 PROCEDURE — 3008F BODY MASS INDEX DOCD: CPT | Performed by: FAMILY MEDICINE

## 2021-07-20 PROCEDURE — 3074F SYST BP LT 130 MM HG: CPT | Performed by: FAMILY MEDICINE

## 2021-07-20 RX ORDER — CYANOCOBALAMIN 1000 UG/ML
1000 INJECTION INTRAMUSCULAR; SUBCUTANEOUS WEEKLY
Status: COMPLETED | OUTPATIENT
Start: 2021-07-20 | End: 2021-08-10

## 2021-07-20 RX ORDER — MEDROXYPROGESTERONE ACETATE 10 MG/1
10 TABLET ORAL DAILY
Qty: 10 TABLET | Refills: 0 | Status: SHIPPED | OUTPATIENT
Start: 2021-07-20 | End: 2021-07-30

## 2021-07-20 RX ORDER — ESCITALOPRAM OXALATE 10 MG/1
10 TABLET ORAL DAILY
Qty: 90 TABLET | Refills: 1 | Status: SHIPPED | OUTPATIENT
Start: 2021-07-20

## 2021-07-20 RX ADMIN — CYANOCOBALAMIN 1000 MCG: 1000 INJECTION INTRAMUSCULAR; SUBCUTANEOUS at 15:20:00

## 2021-07-20 NOTE — PATIENT INSTRUCTIONS
Take provera for 10 days. Expect cycle. Plan to have US following cycle. Schedule follow up with gyne in September.

## 2021-07-20 NOTE — PROGRESS NOTES
Vitamin B12 injection ordered by Dr. Jody May on 7/20/21 weekly X 4 doses. Patient received dose #1 to right deltoid. Tolerated well and left in stable condition.

## 2021-07-20 NOTE — PROGRESS NOTES
[facility] [place]  PROGRESS NOTE        HPI:   This is a 55year old female coming in for Patient presents with:  Menstrual Problem: Cycle is lasting for weeks and wants to get an ablasion    HPI she is complaining of cycles which are irregular ug/dL    % Saturation 15 15 - 50 %   TSH W REFLEX TO FREE T4   Result Value Ref Range    TSH 0.984 0.358 - 3.740 mIU/mL   URIC ACID, SERUM   Result Value Ref Range    Uric Acid 3.4 2.6 - 6.0 mg/dL   VITAMIN B12   Result Value Ref Range    Vitamin B12 183 ( 34.0 pg    MCHC 31.4 31.0 - 37.0 g/dL    RDW 13.2 11.0 - 15.0 %    RDW-SD 47.1 (H) 35.1 - 46.3 fL    Neutrophil Absolute Prelim 4.78 1.50 - 7.70 x10 (3) uL    Neutrophil Absolute 4.78 1.50 - 7.70 x10(3) uL    Lymphocyte Absolute 1.67 1.00 - 4.00 x10(3) uL Anxiety state     Strain of back     Dysthymia     Hearing loss     Benign neoplasm of skin     Vitamin D deficiency      REVIEW OF SYSTEMS:   Review of Systems   Constitutional: Negative. HENT: Negative. Eyes: Negative. Respiratory: Negative. normal.      Breath sounds: Normal breath sounds. Abdominal:      General: Bowel sounds are normal. There is no distension. Palpations: Abdomen is soft. Genitourinary:     General: Normal vulva.       Comments: Cervix normal.   Uterus small   Retro to call with any side effects or complications from the treatments as a result of today.        Susan Dela Cruz MD  7/20/2021  2:46 PM  Immunization History   Administered Date(s) Administered   • Covid-19 Vaccine Moderna 100 mcg/0.5 ml 02/25/2021, 04/14/2021

## 2021-07-21 LAB — HPV I/H RISK 1 DNA SPEC QL NAA+PROBE: NEGATIVE

## 2021-07-22 ENCOUNTER — TELEPHONE (OUTPATIENT)
Dept: FAMILY MEDICINE CLINIC | Facility: CLINIC | Age: 46
End: 2021-07-22

## 2021-07-24 RX ORDER — ESCITALOPRAM OXALATE 10 MG/1
TABLET ORAL
Qty: 90 TABLET | Refills: 1 | OUTPATIENT
Start: 2021-07-24

## 2021-07-26 ENCOUNTER — TELEPHONE (OUTPATIENT)
Dept: FAMILY MEDICINE CLINIC | Facility: CLINIC | Age: 46
End: 2021-07-26

## 2021-07-26 NOTE — TELEPHONE ENCOUNTER
----- Message from Yolanda Olivia MD sent at 7/26/2021  9:44 AM CDT -----  Results reviewed. Released to [employer].  (pap) show no significant abnormalities.

## 2021-07-27 ENCOUNTER — NURSE ONLY (OUTPATIENT)
Dept: FAMILY MEDICINE CLINIC | Facility: CLINIC | Age: 46
End: 2021-07-27
Payer: COMMERCIAL

## 2021-07-27 DIAGNOSIS — E53.8 LOW SERUM VITAMIN B12: ICD-10-CM

## 2021-07-27 PROCEDURE — 96372 THER/PROPH/DIAG INJ SC/IM: CPT | Performed by: FAMILY MEDICINE

## 2021-07-27 RX ADMIN — CYANOCOBALAMIN 1000 MCG: 1000 INJECTION INTRAMUSCULAR; SUBCUTANEOUS at 08:43:00

## 2021-07-28 ENCOUNTER — TELEPHONE (OUTPATIENT)
Dept: FAMILY MEDICINE CLINIC | Facility: CLINIC | Age: 46
End: 2021-07-28

## 2021-07-28 NOTE — TELEPHONE ENCOUNTER
please forward referral to [facility]                   Future Appointments   Date Time Provider Bill Isbell   8/3/2021 11:00 AM [facility] [place] NURSE [facility] [place] [facility]   8/10/2021  8:45 AM [facility] [place] NURSE [facility] [place] [facility]

## 2021-08-03 ENCOUNTER — NURSE ONLY (OUTPATIENT)
Dept: FAMILY MEDICINE CLINIC | Facility: CLINIC | Age: 46
End: 2021-08-03
Payer: COMMERCIAL

## 2021-08-03 DIAGNOSIS — E53.8 B12 DEFICIENCY: ICD-10-CM

## 2021-08-03 PROCEDURE — 96372 THER/PROPH/DIAG INJ SC/IM: CPT | Performed by: FAMILY MEDICINE

## 2021-08-03 RX ADMIN — CYANOCOBALAMIN 1000 MCG: 1000 INJECTION INTRAMUSCULAR; SUBCUTANEOUS at 11:09:00

## 2021-08-03 NOTE — PROGRESS NOTES
Patient here for #3 of 4 weekly B12 injections ordered by Dr. Tressa Michel on 7/12/21. Patient was stung by a wasp in left upper arm recently and requests that B12 be given in left arm. B12 given IM left deltoid. Well tolerated by patient.

## 2021-08-10 ENCOUNTER — NURSE ONLY (OUTPATIENT)
Dept: FAMILY MEDICINE CLINIC | Facility: CLINIC | Age: 46
End: 2021-08-10
Payer: COMMERCIAL

## 2021-08-10 VITALS — TEMPERATURE: 99 F

## 2021-08-10 PROCEDURE — 96372 THER/PROPH/DIAG INJ SC/IM: CPT | Performed by: FAMILY MEDICINE

## 2021-08-10 RX ADMIN — CYANOCOBALAMIN 1000 MCG: 1000 INJECTION INTRAMUSCULAR; SUBCUTANEOUS at 15:32:00

## 2021-08-10 NOTE — PROGRESS NOTES
Patient presents for #4 of 4 weekly B12 injections ordered by Dr. En Prado on 7/12/21. B12 given IM right deltoid. Patient tolerated well and left in stable condition.

## 2021-08-23 ENCOUNTER — OFFICE VISIT (OUTPATIENT)
Dept: FAMILY MEDICINE CLINIC | Facility: CLINIC | Age: 46
End: 2021-08-23
Payer: COMMERCIAL

## 2021-08-23 VITALS
BODY MASS INDEX: 23.48 KG/M2 | TEMPERATURE: 98 F | DIASTOLIC BLOOD PRESSURE: 80 MMHG | SYSTOLIC BLOOD PRESSURE: 102 MMHG | WEIGHT: 164 LBS | RESPIRATION RATE: 16 BRPM | HEART RATE: 76 BPM | HEIGHT: 70 IN | OXYGEN SATURATION: 97 %

## 2021-08-23 DIAGNOSIS — E55.9 VITAMIN D DEFICIENCY: Primary | ICD-10-CM

## 2021-08-23 DIAGNOSIS — E53.8 B12 DEFICIENCY: ICD-10-CM

## 2021-08-23 DIAGNOSIS — F41.1 ANXIETY STATE: ICD-10-CM

## 2021-08-23 DIAGNOSIS — N92.1 MENOMETRORRHAGIA: ICD-10-CM

## 2021-08-23 PROCEDURE — 3074F SYST BP LT 130 MM HG: CPT | Performed by: FAMILY MEDICINE

## 2021-08-23 PROCEDURE — 99213 OFFICE O/P EST LOW 20 MIN: CPT | Performed by: FAMILY MEDICINE

## 2021-08-23 PROCEDURE — 3079F DIAST BP 80-89 MM HG: CPT | Performed by: FAMILY MEDICINE

## 2021-08-23 PROCEDURE — 3008F BODY MASS INDEX DOCD: CPT | Performed by: FAMILY MEDICINE

## 2021-08-23 NOTE — PROGRESS NOTES
[facility] [place]  PROGRESS NOTE        HPI:   This is a 55year old female coming in for Patient presents with:  Medication Follow-Up    HPI she did the shots of b and the daily iron as they were quite low,   And then we did the progesterone t First Screen:          Gino Derrick                                                                    Specimen:     ThinPrep Imager Screening Pap, Endocervix                                                     Past Me Hematological: Negative. Psychiatric/Behavioral: Negative. All other systems reviewed and are negative.       EXAM:   /80 (BP Location: Left arm, Patient Position: Sitting, Cuff Size: adult)   Pulse 76   Temp 97.8 °F (36.6 °C) (Temporal)   Res Judgment normal.         ASSESSMENT AND PLAN:   Renee was seen today for medication follow-up. Diagnoses and all orders for this visit:    Vitamin D deficiency   continue supplementation  Check in 6 months.      Anxiety state   Doing well continue pinky

## 2021-08-23 NOTE — PATIENT INSTRUCTIONS
Schedule labs in October, for iron b12 levels. Continue supplementation. Refer to Dr. Darleen Pineda for colonoscopy  [phone].

## 2021-10-16 ENCOUNTER — LABORATORY ENCOUNTER (OUTPATIENT)
Dept: LAB | Age: 46
End: 2021-10-16
Attending: FAMILY MEDICINE
Payer: COMMERCIAL

## 2021-10-16 DIAGNOSIS — R79.0 LOW IRON STORES: ICD-10-CM

## 2021-10-16 PROCEDURE — 83540 ASSAY OF IRON: CPT | Performed by: FAMILY MEDICINE

## 2021-10-16 PROCEDURE — 82728 ASSAY OF FERRITIN: CPT | Performed by: FAMILY MEDICINE

## 2021-10-16 PROCEDURE — 83550 IRON BINDING TEST: CPT | Performed by: FAMILY MEDICINE

## 2022-01-04 ENCOUNTER — TELEMEDICINE (OUTPATIENT)
Dept: FAMILY MEDICINE CLINIC | Facility: CLINIC | Age: 47
End: 2022-01-04

## 2022-01-04 VITALS — BODY MASS INDEX: 23.89 KG/M2 | TEMPERATURE: 98 F | WEIGHT: 165 LBS | HEIGHT: 69.5 IN

## 2022-01-04 DIAGNOSIS — J06.9 VIRAL UPPER RESPIRATORY TRACT INFECTION: Primary | ICD-10-CM

## 2022-01-04 PROCEDURE — 99213 OFFICE O/P EST LOW 20 MIN: CPT | Performed by: NURSE PRACTITIONER

## 2022-01-04 PROCEDURE — 3008F BODY MASS INDEX DOCD: CPT | Performed by: NURSE PRACTITIONER

## 2022-01-04 NOTE — PATIENT INSTRUCTIONS
Rest, increase fluids, salt water gargles ,neti pot (use distilled water) or saline nasal spray, sudafed (behind the counter),  Tylenol/Ibuprofen, follow up if symptoms persist or increase.       Sleep on your stomach     Take deep breaths     Vitamin D 500

## 2022-01-04 NOTE — PROGRESS NOTES
CHIEF COMPLAINT:   Patient presents with:  Upper Respiratory Infection      HPI:   Linn Room is a 55year old female who presents to clinic today via video visit with complaints of feeling ill since 12/28- nose and throat - took zicam - and rested visit–forced cough sounds dry–no audible wheezes or rhonchi.   ABDOMEN: Per patient–soft nontender  SKIN: no rashes,no suspicious lesions  ASSESSMENT AND PLAN:   Jeff Fisher is a 55year old female who presents with ear problems symptoms are consistent

## 2022-03-02 ENCOUNTER — TELEPHONE (OUTPATIENT)
Dept: FAMILY MEDICINE CLINIC | Facility: CLINIC | Age: 47
End: 2022-03-02

## 2022-03-02 NOTE — TELEPHONE ENCOUNTER
Future appt:  [facility]  Last Appointment with provider:   8/23/21 medication follow up  Last appointment at [facility]:  Visit date not found  Cholesterol, Total (mg/dL)   Date Value   07/08/2021 182     HDL Cholesterol (mg/dL)   Date Value   07/08/2021 73 (H)     LDL Cholesterol (mg/dL)   Date Value   07/08/2021 93     Triglycerides (mg/dL)   Date Value   07/08/2021 86     Lab Results   Component Value Date     01/08/2021    A1C 5.8 (H) 01/08/2021     Lab Results   Component Value Date    TSH 0.984 07/08/2021       No follow-ups on file.

## 2022-03-02 NOTE — TELEPHONE ENCOUNTER
RF escitalopram      to     [facility]  on [facility] AT [place]         No future appointments.

## 2022-03-03 ENCOUNTER — TELEPHONE (OUTPATIENT)
Dept: FAMILY MEDICINE CLINIC | Facility: CLINIC | Age: 47
End: 2022-03-03

## 2022-03-03 RX ORDER — ESCITALOPRAM OXALATE 10 MG/1
10 TABLET ORAL DAILY
Qty: 90 TABLET | Refills: 1 | Status: SHIPPED | OUTPATIENT
Start: 2022-03-03 | End: 2022-03-03

## 2022-03-03 RX ORDER — ESCITALOPRAM OXALATE 10 MG/1
10 TABLET ORAL DAILY
Qty: 90 TABLET | Refills: 0 | Status: SHIPPED | OUTPATIENT
Start: 2022-03-03

## 2022-03-03 NOTE — TELEPHONE ENCOUNTER
scheduled an appointment, needs a refill on her generic lexapro, only has a few left.   Pharmacy: [facility]  Future Appointments   Date Time Provider Bill Akilah   3/16/2022  3:30 PM [place], APRN [facility] [place] [facility]

## 2022-03-03 NOTE — TELEPHONE ENCOUNTER
Future Appointments   Date Time Provider Bill Isbell   3/16/2022  3:30 PM SILVERIO Chappell [facility] [place] [facility]

## 2022-03-03 NOTE — TELEPHONE ENCOUNTER
Future appt: Your appointments     Date & Time Appointment Department [facility])    Mar 16, 2022  3:30 PM CDT Physical - Established with Nilson Llanos, [address], [place] Flatter ([facility])            [address] [facility] [address] [id number]  [phone]        Last Appointment with provider:   Visit date not found  Last appointment at [facility]:  Visit date not found  Cholesterol, Total (mg/dL)   Date Value   07/08/2021 182     HDL Cholesterol (mg/dL)   Date Value   07/08/2021 73 (H)     LDL Cholesterol (mg/dL)   Date Value   07/08/2021 93     Triglycerides (mg/dL)   Date Value   07/08/2021 86     Lab Results   Component Value Date     01/08/2021    A1C 5.8 (H) 01/08/2021     Lab Results   Component Value Date    TSH 0.984 07/08/2021       No follow-ups on file. Last rf 7/20/21    Last px 1/28/2020      Anxiety state          8/23/21     Doing well continue lexapro.   Recheck in 6 months. - [facility]

## 2022-03-16 ENCOUNTER — LAB ENCOUNTER (OUTPATIENT)
Dept: LAB | Age: 47
End: 2022-03-16
Attending: NURSE PRACTITIONER
Payer: COMMERCIAL

## 2022-03-16 ENCOUNTER — OFFICE VISIT (OUTPATIENT)
Dept: FAMILY MEDICINE CLINIC | Facility: CLINIC | Age: 47
End: 2022-03-16
Payer: COMMERCIAL

## 2022-03-16 VITALS
WEIGHT: 155 LBS | OXYGEN SATURATION: 98 % | SYSTOLIC BLOOD PRESSURE: 92 MMHG | HEIGHT: 70 IN | RESPIRATION RATE: 18 BRPM | DIASTOLIC BLOOD PRESSURE: 56 MMHG | TEMPERATURE: 98 F | HEART RATE: 86 BPM | BODY MASS INDEX: 22.19 KG/M2

## 2022-03-16 DIAGNOSIS — Z12.31 SCREENING MAMMOGRAM, ENCOUNTER FOR: ICD-10-CM

## 2022-03-16 DIAGNOSIS — Z00.00 ENCOUNTER FOR HEALTH MAINTENANCE EXAMINATION IN ADULT: Primary | ICD-10-CM

## 2022-03-16 DIAGNOSIS — Z12.11 ENCOUNTER FOR SCREENING FOR MALIGNANT NEOPLASM OF COLON: ICD-10-CM

## 2022-03-16 DIAGNOSIS — E53.8 B12 DEFICIENCY: ICD-10-CM

## 2022-03-16 DIAGNOSIS — E55.9 VITAMIN D DEFICIENCY: ICD-10-CM

## 2022-03-16 DIAGNOSIS — R79.0 LOW IRON STORES: ICD-10-CM

## 2022-03-16 LAB
BASOPHILS # BLD AUTO: 0.07 X10(3) UL (ref 0–0.2)
BASOPHILS NFR BLD AUTO: 0.7 %
DEPRECATED HBV CORE AB SER IA-ACNC: 47.3 NG/ML
EOSINOPHIL # BLD AUTO: 0.18 X10(3) UL (ref 0–0.7)
EOSINOPHIL NFR BLD AUTO: 1.8 %
ERYTHROCYTE [DISTWIDTH] IN BLOOD BY AUTOMATED COUNT: 12.6 %
HCT VFR BLD AUTO: 39.2 %
HGB BLD-MCNC: 13.2 G/DL
IMM GRANULOCYTES # BLD AUTO: 0.03 X10(3) UL (ref 0–1)
IMM GRANULOCYTES NFR BLD: 0.3 %
IRON SERPL-MCNC: 134 UG/DL
LYMPHOCYTES # BLD AUTO: 2.59 X10(3) UL (ref 1–4)
LYMPHOCYTES NFR BLD AUTO: 25.5 %
MCH RBC QN AUTO: 30.8 PG (ref 26–34)
MCHC RBC AUTO-ENTMCNC: 33.7 G/DL (ref 31–37)
MCV RBC AUTO: 91.4 FL
MONOCYTES # BLD AUTO: 0.66 X10(3) UL (ref 0.1–1)
MONOCYTES NFR BLD AUTO: 6.5 %
NEUTROPHILS # BLD AUTO: 6.61 X10 (3) UL (ref 1.5–7.7)
NEUTROPHILS # BLD AUTO: 6.61 X10(3) UL (ref 1.5–7.7)
NEUTROPHILS NFR BLD AUTO: 65.2 %
PLATELET # BLD AUTO: 388 10(3)UL (ref 150–450)
RBC # BLD AUTO: 4.29 X10(6)UL
TIBC SERPL-MCNC: 405 UG/DL (ref 240–450)
TRANSFERRIN SERPL-MCNC: 272 MG/DL (ref 200–360)
VIT B12 SERPL-MCNC: 263 PG/ML (ref 193–986)
WBC # BLD AUTO: 10.1 X10(3) UL (ref 4–11)

## 2022-03-16 PROCEDURE — 3008F BODY MASS INDEX DOCD: CPT | Performed by: NURSE PRACTITIONER

## 2022-03-16 PROCEDURE — 82728 ASSAY OF FERRITIN: CPT | Performed by: NURSE PRACTITIONER

## 2022-03-16 PROCEDURE — 82607 VITAMIN B-12: CPT | Performed by: NURSE PRACTITIONER

## 2022-03-16 PROCEDURE — 85025 COMPLETE CBC W/AUTO DIFF WBC: CPT | Performed by: NURSE PRACTITIONER

## 2022-03-16 PROCEDURE — 3074F SYST BP LT 130 MM HG: CPT | Performed by: NURSE PRACTITIONER

## 2022-03-16 PROCEDURE — 83540 ASSAY OF IRON: CPT | Performed by: NURSE PRACTITIONER

## 2022-03-16 PROCEDURE — 83550 IRON BINDING TEST: CPT | Performed by: NURSE PRACTITIONER

## 2022-03-16 PROCEDURE — 99396 PREV VISIT EST AGE 40-64: CPT | Performed by: NURSE PRACTITIONER

## 2022-03-16 PROCEDURE — 3078F DIAST BP <80 MM HG: CPT | Performed by: NURSE PRACTITIONER

## 2022-03-17 ENCOUNTER — TELEPHONE (OUTPATIENT)
Dept: FAMILY MEDICINE CLINIC | Facility: CLINIC | Age: 47
End: 2022-03-17

## 2022-03-17 NOTE — TELEPHONE ENCOUNTER
----- Message from SILVERIO Quiñonez sent at 3/17/2022  7:55 AM CDT -----  Please make sure patient receives my chart message as below-  Your vitamin B12 level is in the normal range but still on the low side of normal-recommend taking a vitamin B12 supplement regularly-if you are taking it regularly we can consider increasing the dose or even B12 injections please let me know.   Your iron is normal, iron stores are normal, CBC is normal.Thank you,Yaneli Mantilla, FNP-BC

## 2022-05-23 ENCOUNTER — TELEPHONE (OUTPATIENT)
Dept: FAMILY MEDICINE CLINIC | Facility: CLINIC | Age: 47
End: 2022-05-23

## 2022-05-23 RX ORDER — ESCITALOPRAM OXALATE 10 MG/1
10 TABLET ORAL DAILY
Qty: 90 TABLET | Refills: 2 | Status: SHIPPED | OUTPATIENT
Start: 2022-05-23

## 2022-05-23 NOTE — TELEPHONE ENCOUNTER
Future appt:    Last Appointment with provider:  3/16/22 Physical  Last appointment at [facility]:  3/16/2022  Cholesterol, Total (mg/dL)   Date Value   07/08/2021 182     HDL Cholesterol (mg/dL)   Date Value   07/08/2021 73 (H)     LDL Cholesterol (mg/dL)   Date Value   07/08/2021 93     Triglycerides (mg/dL)   Date Value   07/08/2021 86     Lab Results   Component Value Date     01/08/2021    A1C 5.8 (H) 01/08/2021     Lab Results   Component Value Date    TSH 0.984 07/08/2021       No follow-ups on file.

## 2022-07-13 NOTE — PATIENT INSTRUCTIONS
Increase Lexapro to 20 mg daily    Schedule appointment with Bethanie Hancock or counselor of choice. Return to clinic in one month - sooner if needed.

## 2022-08-23 NOTE — PATIENT INSTRUCTIONS
Continue Lexapro at 20 mg. Do not stop abruptly. Continue with counseling. Follow-up in 6 months, sooner if needed.

## 2022-10-10 RX ORDER — ESCITALOPRAM OXALATE 20 MG/1
TABLET ORAL
Qty: 90 TABLET | Refills: 0 | Status: SHIPPED | OUTPATIENT
Start: 2022-10-10

## 2022-10-10 NOTE — TELEPHONE ENCOUNTER
escitalopram 20 MG Oral Tab     #90  R-0       Summary: Take 1 tablet (20 mg total) by mouth daily        Last Refill- 7/13/22    Future appt: Your appointments     Date & Time Appointment Department [facility])    Feb 22, 2023  3:30 PM CST Follow Up Visit with [address] [facility] [address], [facility], [address] [facility], [facility] ([place])            [address] [place] [address] [address] [id number]  [phone]        Last Appointment with provider:   8/23/2022  Last appointment at [facility]:  8/23/2022  Cholesterol, Total (mg/dL)   Date Value   07/08/2021 182     HDL Cholesterol (mg/dL)   Date Value   07/08/2021 73 (H)     LDL Cholesterol (mg/dL)   Date Value   07/08/2021 93     Triglycerides (mg/dL)   Date Value   07/08/2021 86     Lab Results   Component Value Date     01/08/2021    A1C 5.8 (H) 01/08/2021     Lab Results   Component Value Date    TSH 0.984 07/08/2021       No follow-ups on file.

## 2023-02-24 ENCOUNTER — TELEPHONE (OUTPATIENT)
Dept: FAMILY MEDICINE CLINIC | Facility: CLINIC | Age: 48
End: 2023-02-24

## 2023-02-24 DIAGNOSIS — Z12.11 ENCOUNTER FOR SCREENING FOR MALIGNANT NEOPLASM OF COLON: Primary | ICD-10-CM

## 2023-02-24 NOTE — TELEPHONE ENCOUNTER
Received care gap message that she is overdue for a colonoscopy. Needs a referral so she can have it done.

## 2023-02-24 NOTE — TELEPHONE ENCOUNTER
Patient is ready to pursue screening colonoscopy. Last order was 3/16/22. Needs new referral.    Was seen in office for PTSD follow up on 2/22/23.

## 2023-02-27 ENCOUNTER — TELEPHONE (OUTPATIENT)
Dept: FAMILY MEDICINE CLINIC | Facility: CLINIC | Age: 48
End: 2023-02-27

## 2023-02-27 DIAGNOSIS — Z12.31 SCREENING MAMMOGRAM, ENCOUNTER FOR: Primary | ICD-10-CM

## 2023-02-27 NOTE — TELEPHONE ENCOUNTER
Referral was done to Dr. Nancy Salinas for GI. Where does she want to do the mammogram? [place] [id number] or within [facility]?

## 2023-04-17 RX ORDER — BUPROPION HYDROCHLORIDE 150 MG/1
TABLET ORAL
Qty: 90 TABLET | Refills: 0 | OUTPATIENT
Start: 2023-04-17

## 2023-05-25 ENCOUNTER — OFFICE VISIT (OUTPATIENT)
Dept: FAMILY MEDICINE CLINIC | Facility: CLINIC | Age: 48
End: 2023-05-25
Payer: COMMERCIAL

## 2023-05-25 VITALS
HEART RATE: 85 BPM | TEMPERATURE: 98 F | RESPIRATION RATE: 16 BRPM | OXYGEN SATURATION: 98 % | BODY MASS INDEX: 25.77 KG/M2 | WEIGHT: 180 LBS | DIASTOLIC BLOOD PRESSURE: 72 MMHG | HEIGHT: 70 IN | SYSTOLIC BLOOD PRESSURE: 122 MMHG

## 2023-05-25 DIAGNOSIS — R35.0 FREQUENCY OF URINATION: Primary | ICD-10-CM

## 2023-05-25 LAB
APPEARANCE: CLEAR
BILIRUB UR QL STRIP.AUTO: NEGATIVE
BILIRUBIN: NEGATIVE
CLARITY UR REFRACT.AUTO: CLEAR
COLOR UR AUTO: YELLOW
GLUCOSE (URINE DIPSTICK): NEGATIVE MG/DL
GLUCOSE UR STRIP.AUTO-MCNC: NEGATIVE MG/DL
KETONES UR STRIP.AUTO-MCNC: NEGATIVE MG/DL
LEUKOCYTE ESTERASE UR QL STRIP.AUTO: NEGATIVE
LEUKOCYTES: NEGATIVE
MULTISTIX LOT#: NORMAL NUMERIC
NITRITE UR QL STRIP.AUTO: NEGATIVE
NITRITE, URINE: NEGATIVE
OCCULT BLOOD: NEGATIVE
PH UR STRIP.AUTO: 7 [PH] (ref 5–8)
PH, URINE: 7.5 (ref 4.5–8)
PROT UR STRIP.AUTO-MCNC: NEGATIVE MG/DL
RBC UR QL AUTO: NEGATIVE
SP GR UR STRIP.AUTO: 1.02 (ref 1–1.03)
SPECIFIC GRAVITY: 1.01 (ref 1–1.03)
URINE-COLOR: YELLOW
UROBILINOGEN UR STRIP.AUTO-MCNC: <2 MG/DL
UROBILINOGEN,SEMI-QN: 0.2 MG/DL (ref 0–1.9)

## 2023-05-25 PROCEDURE — 81003 URINALYSIS AUTO W/O SCOPE: CPT | Performed by: NURSE PRACTITIONER

## 2023-05-25 RX ORDER — NITROFURANTOIN 25; 75 MG/1; MG/1
100 CAPSULE ORAL 2 TIMES DAILY
Qty: 20 CAPSULE | Refills: 0 | Status: SHIPPED | OUTPATIENT
Start: 2023-05-25 | End: 2023-06-04

## 2023-05-25 NOTE — PATIENT INSTRUCTIONS
Urinary tract infection cause and prevention discussed. Drink more water, don't hold urine, urinate after intercourse, don't take bubble baths or use scented soaps, don't use powders, keep bowels regular. Follow up if symptoms persist or if you experience flank pain, vomiting, or fever. To help constipation, it is important to eat a healthy diet, increase non- caffeine beverages, increase fiber, fruits and veggies, etc. and increase exercise. You may also take Miralax 17 g in 8 oz of water once a day until you have a soft bowel movement. You can take it less often to maintain soft stools.

## 2023-05-30 NOTE — TELEPHONE ENCOUNTER
Future appt: Your appointments     Date & Time Appointment Department [facility])    Jun 08, 2023  7:30 AM CDT Physical - Established with SILVERIO Whitmore [address], Sebastian Raysal ([place])            [address], [facility]  [place] [address] [phone]  [phone]        Last Appointment with provider:   5/22/2023 Anxiety follow up  Last appointment at [facility]:  5/25/2023  Cholesterol, Total (mg/dL)   Date Value   07/08/2021 182     HDL Cholesterol (mg/dL)   Date Value   07/08/2021 73 (H)     LDL Cholesterol (mg/dL)   Date Value   07/08/2021 93     Triglycerides (mg/dL)   Date Value   07/08/2021 86     Lab Results   Component Value Date     01/08/2021    A1C 5.8 (H) 01/08/2021     Lab Results   Component Value Date    TSH 0.984 07/08/2021       No follow-ups on file.

## 2023-05-31 RX ORDER — BUPROPION HYDROCHLORIDE 300 MG/1
TABLET ORAL
Qty: 90 TABLET | Refills: 0 | Status: SHIPPED | OUTPATIENT
Start: 2023-05-31

## 2023-06-08 ENCOUNTER — OFFICE VISIT (OUTPATIENT)
Dept: FAMILY MEDICINE CLINIC | Facility: CLINIC | Age: 48
End: 2023-06-08
Payer: COMMERCIAL

## 2023-06-08 ENCOUNTER — LAB ENCOUNTER (OUTPATIENT)
Dept: LAB | Age: 48
End: 2023-06-08
Attending: NURSE PRACTITIONER
Payer: COMMERCIAL

## 2023-06-08 VITALS
HEART RATE: 89 BPM | HEIGHT: 70.25 IN | BODY MASS INDEX: 25.65 KG/M2 | SYSTOLIC BLOOD PRESSURE: 118 MMHG | TEMPERATURE: 97 F | WEIGHT: 179.19 LBS | OXYGEN SATURATION: 99 % | DIASTOLIC BLOOD PRESSURE: 72 MMHG | RESPIRATION RATE: 16 BRPM

## 2023-06-08 DIAGNOSIS — R73.9 HYPERGLYCEMIA: ICD-10-CM

## 2023-06-08 DIAGNOSIS — M54.50 ACUTE MIDLINE LOW BACK PAIN WITHOUT SCIATICA: ICD-10-CM

## 2023-06-08 DIAGNOSIS — E53.8 B12 DEFICIENCY: ICD-10-CM

## 2023-06-08 DIAGNOSIS — Z00.00 ENCOUNTER FOR HEALTH MAINTENANCE EXAMINATION IN ADULT: Primary | ICD-10-CM

## 2023-06-08 DIAGNOSIS — F41.1 ANXIETY STATE: ICD-10-CM

## 2023-06-08 DIAGNOSIS — Z00.00 ENCOUNTER FOR HEALTH MAINTENANCE EXAMINATION IN ADULT: ICD-10-CM

## 2023-06-08 DIAGNOSIS — F34.1 DYSTHYMIA: ICD-10-CM

## 2023-06-08 LAB
ALBUMIN SERPL-MCNC: 4.1 G/DL (ref 3.4–5)
ALBUMIN/GLOB SERPL: 1.2 {RATIO} (ref 1–2)
ALP LIVER SERPL-CCNC: 73 U/L
ALT SERPL-CCNC: 28 U/L
ANION GAP SERPL CALC-SCNC: 3 MMOL/L (ref 0–18)
AST SERPL-CCNC: 18 U/L (ref 15–37)
BASOPHILS # BLD AUTO: 0.06 X10(3) UL (ref 0–0.2)
BASOPHILS NFR BLD AUTO: 0.9 %
BILIRUB SERPL-MCNC: 0.3 MG/DL (ref 0.1–2)
BUN BLD-MCNC: 10 MG/DL (ref 7–18)
CALCIUM BLD-MCNC: 9.1 MG/DL (ref 8.5–10.1)
CHLORIDE SERPL-SCNC: 106 MMOL/L (ref 98–112)
CHOLEST SERPL-MCNC: 214 MG/DL (ref ?–200)
CO2 SERPL-SCNC: 28 MMOL/L (ref 21–32)
CREAT BLD-MCNC: 0.79 MG/DL
EOSINOPHIL # BLD AUTO: 0.17 X10(3) UL (ref 0–0.7)
EOSINOPHIL NFR BLD AUTO: 2.4 %
ERYTHROCYTE [DISTWIDTH] IN BLOOD BY AUTOMATED COUNT: 13.2 %
FASTING PATIENT LIPID ANSWER: YES
FASTING STATUS PATIENT QL REPORTED: YES
GFR SERPLBLD BASED ON 1.73 SQ M-ARVRAT: 92 ML/MIN/1.73M2 (ref 60–?)
GLOBULIN PLAS-MCNC: 3.4 G/DL (ref 2.8–4.4)
GLUCOSE BLD-MCNC: 103 MG/DL (ref 70–99)
HCT VFR BLD AUTO: 43 %
HDLC SERPL-MCNC: 73 MG/DL (ref 40–59)
HGB BLD-MCNC: 14.4 G/DL
IMM GRANULOCYTES # BLD AUTO: 0.03 X10(3) UL (ref 0–1)
IMM GRANULOCYTES NFR BLD: 0.4 %
LDLC SERPL CALC-MCNC: 105 MG/DL (ref ?–100)
LYMPHOCYTES # BLD AUTO: 1.64 X10(3) UL (ref 1–4)
LYMPHOCYTES NFR BLD AUTO: 23.6 %
MCH RBC QN AUTO: 30.5 PG (ref 26–34)
MCHC RBC AUTO-ENTMCNC: 33.5 G/DL (ref 31–37)
MCV RBC AUTO: 91.1 FL
MONOCYTES # BLD AUTO: 0.54 X10(3) UL (ref 0.1–1)
MONOCYTES NFR BLD AUTO: 7.8 %
NEUTROPHILS # BLD AUTO: 4.5 X10 (3) UL (ref 1.5–7.7)
NEUTROPHILS # BLD AUTO: 4.5 X10(3) UL (ref 1.5–7.7)
NEUTROPHILS NFR BLD AUTO: 64.9 %
NONHDLC SERPL-MCNC: 141 MG/DL (ref ?–130)
OSMOLALITY SERPL CALC.SUM OF ELEC: 283 MOSM/KG (ref 275–295)
PLATELET # BLD AUTO: 416 10(3)UL (ref 150–450)
POTASSIUM SERPL-SCNC: 4.3 MMOL/L (ref 3.5–5.1)
PROT SERPL-MCNC: 7.5 G/DL (ref 6.4–8.2)
RBC # BLD AUTO: 4.72 X10(6)UL
SODIUM SERPL-SCNC: 137 MMOL/L (ref 136–145)
T4 FREE SERPL-MCNC: 0.9 NG/DL (ref 0.8–1.7)
TRIGL SERPL-MCNC: 215 MG/DL (ref 30–149)
TSI SER-ACNC: 1.27 MIU/ML (ref 0.36–3.74)
VIT B12 SERPL-MCNC: 279 PG/ML (ref 193–986)
VLDLC SERPL CALC-MCNC: 36 MG/DL (ref 0–30)
WBC # BLD AUTO: 6.9 X10(3) UL (ref 4–11)

## 2023-06-08 PROCEDURE — 80061 LIPID PANEL: CPT | Performed by: NURSE PRACTITIONER

## 2023-06-08 PROCEDURE — 84439 ASSAY OF FREE THYROXINE: CPT | Performed by: NURSE PRACTITIONER

## 2023-06-08 PROCEDURE — 99396 PREV VISIT EST AGE 40-64: CPT | Performed by: NURSE PRACTITIONER

## 2023-06-08 PROCEDURE — 83036 HEMOGLOBIN GLYCOSYLATED A1C: CPT | Performed by: NURSE PRACTITIONER

## 2023-06-08 PROCEDURE — 3008F BODY MASS INDEX DOCD: CPT | Performed by: NURSE PRACTITIONER

## 2023-06-08 PROCEDURE — 80050 GENERAL HEALTH PANEL: CPT | Performed by: NURSE PRACTITIONER

## 2023-06-08 PROCEDURE — 3074F SYST BP LT 130 MM HG: CPT | Performed by: NURSE PRACTITIONER

## 2023-06-08 PROCEDURE — 3078F DIAST BP <80 MM HG: CPT | Performed by: NURSE PRACTITIONER

## 2023-06-08 PROCEDURE — 82607 VITAMIN B-12: CPT | Performed by: NURSE PRACTITIONER

## 2023-06-08 NOTE — PATIENT INSTRUCTIONS
Schedule mammogram     Colonoscopy in Aug as planned     It is important to get enough sleep (at least 7 hrs a night). Increase EXERCISE, eat a healthy diet (5 fruits and/or vegetables a day), stay hydrated,  take a multivitamin, take fish/krill oil capsules, learn something new, avoid excessive caffeine, avoid alcohol, cigarettes, and street drugs.       Consider sleep evaluation - Dr. Ritchie Pinedo or Ila Ruiz NP     Fasting blood work

## 2023-06-09 DIAGNOSIS — R73.9 HYPERGLYCEMIA: Primary | ICD-10-CM

## 2023-06-09 LAB
EST. AVERAGE GLUCOSE BLD GHB EST-MCNC: 114 MG/DL (ref 68–126)
HBA1C MFR BLD: 5.6 % (ref ?–5.7)

## 2023-08-14 NOTE — PROGRESS NOTES
Patient here for # 2 of 4 weekly B12 injections. B12 given IM in left deltoid. Well tolerated by patient. Next weekly B12 injection nurse visits are scheduled.      Future Appointments   Date Time Provider Bill Isbell   8/3/2021 11:00 AM BUSHRA LAGUNA PRE-OP DIAGNOSIS:  Hyperparathyroidism 14-Aug-2023 15:59:26  Mendel Saucedo

## 2023-09-05 RX ORDER — BUPROPION HYDROCHLORIDE 300 MG/1
300 TABLET ORAL DAILY
Qty: 90 TABLET | Refills: 0 | Status: SHIPPED | OUTPATIENT
Start: 2023-09-05

## 2023-09-05 NOTE — TELEPHONE ENCOUNTER
Last Refill: 5/31/2023 #90 with 0 refills  Last Px: 6/8/2023  F/U Instructions: Consider sleep evaluation    Future appt: Your appointments       Date & Time Appointment Department [facility])    Sep 21, 2023  2:40 PM CDT Exam - Established with Greg Henderson MD [address] [facility], Hira [place] ([facility])              [address] [facility], [facility] [place]  [place] [address] [phone]  [phone]          Last Appointment with provider:   5/22/2023  Last appointment at [facility]:  6/8/2023  Cholesterol, Total (mg/dL)   Date Value   06/08/2023 214 (H)     HDL Cholesterol (mg/dL)   Date Value   06/08/2023 73 (H)     LDL Cholesterol (mg/dL)   Date Value   06/08/2023 105 (H)     Triglycerides (mg/dL)   Date Value   06/08/2023 215 (H)     Lab Results   Component Value Date     06/08/2023    A1C 5.6 06/08/2023     Lab Results   Component Value Date    T4F 0.9 06/08/2023    TSH 1.270 06/08/2023       No follow-ups on file.

## 2025-08-11 ENCOUNTER — WALK IN (OUTPATIENT)
Dept: URGENT CARE | Age: 50
End: 2025-08-11

## 2025-08-11 VITALS
RESPIRATION RATE: 16 BRPM | DIASTOLIC BLOOD PRESSURE: 84 MMHG | SYSTOLIC BLOOD PRESSURE: 130 MMHG | HEART RATE: 97 BPM | OXYGEN SATURATION: 97 % | TEMPERATURE: 96.8 F

## 2025-08-11 DIAGNOSIS — T63.444A BEE STING, UNDETERMINED INTENT, INITIAL ENCOUNTER: Primary | ICD-10-CM

## 2025-08-11 PROCEDURE — 99204 OFFICE O/P NEW MOD 45 MIN: CPT | Performed by: INTERNAL MEDICINE

## 2025-08-11 RX ORDER — CEPHALEXIN 500 MG/1
500 CAPSULE ORAL 3 TIMES DAILY
Qty: 30 CAPSULE | Refills: 0 | Status: SHIPPED | OUTPATIENT
Start: 2025-08-11 | End: 2025-08-21

## 2025-08-11 RX ORDER — BUPROPION HYDROCHLORIDE 300 MG/1
300 TABLET ORAL DAILY
COMMUNITY
Start: 2025-06-07

## 2025-08-11 RX ORDER — PREDNISONE 20 MG/1
40 TABLET ORAL DAILY
Qty: 10 TABLET | Refills: 0 | Status: SHIPPED | OUTPATIENT
Start: 2025-08-11 | End: 2025-08-16

## 2025-08-11 RX ORDER — DEXTROAMPHETAMINE SACCHARATE, AMPHETAMINE ASPARTATE MONOHYDRATE, DEXTROAMPHETAMINE SULFATE AND AMPHETAMINE SULFATE 2.5; 2.5; 2.5; 2.5 MG/1; MG/1; MG/1; MG/1
CAPSULE, EXTENDED RELEASE ORAL
COMMUNITY
Start: 2025-08-07

## 2025-08-11 RX ORDER — EPINEPHRINE 0.3 MG/.3ML
0.3 INJECTION SUBCUTANEOUS
Qty: 2 EACH | Refills: 0 | Status: SHIPPED | OUTPATIENT
Start: 2025-08-11

## (undated) NOTE — LETTER
01/17/19        [place] 44      Dear Jordan Mathews,    Our records indicate that you have outstanding lab work and or testing that was ordered for you and has not yet been completed:  Orders Placed This Encounter

## (undated) NOTE — LETTER
06/03/20        [address] [place] [id number]      Dear Kenn Zhong,    Our records indicate that you have outstanding lab work and or testing that was ordered for you and has not yet been completed:  Orders Placed This Encounter

## (undated) NOTE — LETTER
08/20/21        [place] 44      Dear Shahida Dial,    Our records indicate that you have outstanding lab work and or testing that was ordered for you and has not yet been completed:  Orders Placed This Encounter